# Patient Record
Sex: FEMALE | Race: WHITE | NOT HISPANIC OR LATINO | URBAN - METROPOLITAN AREA
[De-identification: names, ages, dates, MRNs, and addresses within clinical notes are randomized per-mention and may not be internally consistent; named-entity substitution may affect disease eponyms.]

---

## 2018-03-04 ENCOUNTER — EMERGENCY (EMERGENCY)
Facility: HOSPITAL | Age: 28
LOS: 0 days | Discharge: LEFT BEFORE DISCHARGE | End: 2018-03-05
Attending: EMERGENCY MEDICINE | Admitting: OBSTETRICS & GYNECOLOGY

## 2018-03-04 VITALS
OXYGEN SATURATION: 100 % | TEMPERATURE: 98 F | HEART RATE: 108 BPM | RESPIRATION RATE: 20 BRPM | DIASTOLIC BLOOD PRESSURE: 97 MMHG | SYSTOLIC BLOOD PRESSURE: 151 MMHG

## 2018-03-04 VITALS
HEART RATE: 84 BPM | SYSTOLIC BLOOD PRESSURE: 132 MMHG | OXYGEN SATURATION: 100 % | DIASTOLIC BLOOD PRESSURE: 81 MMHG | TEMPERATURE: 98 F | RESPIRATION RATE: 20 BRPM

## 2018-03-04 DIAGNOSIS — F41.9 ANXIETY DISORDER, UNSPECIFIED: ICD-10-CM

## 2018-03-04 DIAGNOSIS — R20.0 ANESTHESIA OF SKIN: ICD-10-CM

## 2018-03-04 DIAGNOSIS — R51 HEADACHE: ICD-10-CM

## 2018-03-04 LAB
ALBUMIN SERPL ELPH-MCNC: 4.4 G/DL — SIGNIFICANT CHANGE UP (ref 3–5.5)
ALP SERPL-CCNC: 43 U/L — SIGNIFICANT CHANGE UP (ref 30–115)
ALT FLD-CCNC: 18 U/L — SIGNIFICANT CHANGE UP (ref 0–41)
ANION GAP SERPL CALC-SCNC: 12 MMOL/L — SIGNIFICANT CHANGE UP (ref 7–14)
AST SERPL-CCNC: 63 U/L — HIGH (ref 0–41)
BASOPHILS # BLD AUTO: 0.04 K/UL — SIGNIFICANT CHANGE UP (ref 0–0.2)
BASOPHILS NFR BLD AUTO: 0.4 % — SIGNIFICANT CHANGE UP (ref 0–1)
BILIRUB SERPL-MCNC: 1.7 MG/DL — HIGH (ref 0.2–1.2)
BUN SERPL-MCNC: 7 MG/DL — LOW (ref 10–20)
CALCIUM SERPL-MCNC: 9.2 MG/DL — SIGNIFICANT CHANGE UP (ref 8.5–10.1)
CHLORIDE SERPL-SCNC: 104 MMOL/L — SIGNIFICANT CHANGE UP (ref 98–110)
CK MB BLD-MCNC: 1 % — SIGNIFICANT CHANGE UP (ref 0–4)
CK MB CFR SERPL CALC: 1.8 NG/ML — SIGNIFICANT CHANGE UP (ref 0.6–6.3)
CK SERPL-CCNC: 205 U/L — SIGNIFICANT CHANGE UP (ref 0–225)
CO2 SERPL-SCNC: 21 MMOL/L — SIGNIFICANT CHANGE UP (ref 17–32)
CREAT SERPL-MCNC: 0.7 MG/DL — SIGNIFICANT CHANGE UP (ref 0.7–1.5)
EOSINOPHIL # BLD AUTO: 0.33 K/UL — SIGNIFICANT CHANGE UP (ref 0–0.7)
EOSINOPHIL NFR BLD AUTO: 2.9 % — SIGNIFICANT CHANGE UP (ref 0–8)
GLUCOSE SERPL-MCNC: 95 MG/DL — SIGNIFICANT CHANGE UP (ref 70–110)
HCT VFR BLD CALC: 39.3 % — SIGNIFICANT CHANGE UP (ref 37–47)
HGB BLD-MCNC: 13.3 G/DL — SIGNIFICANT CHANGE UP (ref 12–16)
IMM GRANULOCYTES NFR BLD AUTO: 0.4 % — HIGH (ref 0.1–0.3)
INR BLD: 0.99 RATIO — SIGNIFICANT CHANGE UP (ref 0.65–1.3)
LYMPHOCYTES # BLD AUTO: 28.7 % — SIGNIFICANT CHANGE UP (ref 20.5–51.1)
LYMPHOCYTES # BLD AUTO: 3.24 K/UL — SIGNIFICANT CHANGE UP (ref 1.2–3.4)
MAGNESIUM SERPL-MCNC: 2.2 MG/DL — SIGNIFICANT CHANGE UP (ref 1.8–2.4)
MCHC RBC-ENTMCNC: 30.2 PG — SIGNIFICANT CHANGE UP (ref 27–31)
MCHC RBC-ENTMCNC: 33.8 G/DL — SIGNIFICANT CHANGE UP (ref 32–37)
MCV RBC AUTO: 89.3 FL — SIGNIFICANT CHANGE UP (ref 81–99)
MONOCYTES # BLD AUTO: 1.03 K/UL — HIGH (ref 0.1–0.6)
MONOCYTES NFR BLD AUTO: 9.1 % — SIGNIFICANT CHANGE UP (ref 1.7–9.3)
NEUTROPHILS # BLD AUTO: 6.61 K/UL — HIGH (ref 1.4–6.5)
NEUTROPHILS NFR BLD AUTO: 58.5 % — SIGNIFICANT CHANGE UP (ref 42.2–75.2)
NRBC # BLD: 0 /100 WBCS — SIGNIFICANT CHANGE UP (ref 0–0)
PLATELET # BLD AUTO: 341 K/UL — SIGNIFICANT CHANGE UP (ref 130–400)
POTASSIUM SERPL-MCNC: 6.1 MMOL/L — CRITICAL HIGH (ref 3.5–5)
POTASSIUM SERPL-SCNC: 6.1 MMOL/L — CRITICAL HIGH (ref 3.5–5)
PROT SERPL-MCNC: 6.8 G/DL — SIGNIFICANT CHANGE UP (ref 6–8)
PROTHROM AB SERPL-ACNC: 10.7 SEC — SIGNIFICANT CHANGE UP (ref 9.95–12.87)
RBC # BLD: 4.4 M/UL — SIGNIFICANT CHANGE UP (ref 4.2–5.4)
RBC # FLD: 12 % — SIGNIFICANT CHANGE UP (ref 11.5–14.5)
SODIUM SERPL-SCNC: 137 MMOL/L — SIGNIFICANT CHANGE UP (ref 135–146)
TROPONIN I SERPL-MCNC: <0.02 NG/ML — SIGNIFICANT CHANGE UP (ref 0–0.05)
WBC # BLD: 11.29 K/UL — HIGH (ref 4.8–10.8)
WBC # FLD AUTO: 11.29 K/UL — HIGH (ref 4.8–10.8)

## 2018-03-04 RX ORDER — ASPIRIN/CALCIUM CARB/MAGNESIUM 324 MG
325 TABLET ORAL ONCE
Qty: 0 | Refills: 0 | Status: COMPLETED | OUTPATIENT
Start: 2018-03-04 | End: 2018-03-04

## 2018-03-04 RX ORDER — METOCLOPRAMIDE HCL 10 MG
10 TABLET ORAL ONCE
Qty: 0 | Refills: 0 | Status: COMPLETED | OUTPATIENT
Start: 2018-03-04 | End: 2018-03-04

## 2018-03-04 RX ORDER — SODIUM CHLORIDE 9 MG/ML
1000 INJECTION INTRAMUSCULAR; INTRAVENOUS; SUBCUTANEOUS ONCE
Qty: 0 | Refills: 0 | Status: DISCONTINUED | OUTPATIENT
Start: 2018-03-04 | End: 2018-03-05

## 2018-03-04 RX ADMIN — Medication 104 MILLIGRAM(S): at 22:24

## 2018-03-04 RX ADMIN — Medication 325 MILLIGRAM(S): at 22:24

## 2018-03-04 NOTE — ED PROVIDER NOTE - OBJECTIVE STATEMENT
27y F wo sig PMH presents for sensation of LUE and LLE numbness that came on suddenly 30 minutes ago. Pt has had mild headache all day. No trauma. No f/c/n/v/d. No hx of similar symptoms.

## 2018-03-04 NOTE — ED PROVIDER NOTE - ATTENDING CONTRIBUTION TO CARE
27yr old female no sig pmhx here for eval of left facial, arm/leg numbness. pt reports that today had development of headache. headache was global not sudden onset. pt atributed it to drinking last night and thought it was just a "hangover". pt however approx 30 minutes prior to arrival developed feeling of numbness to left face, left upper and lower ext. sx's not associated with weakness, neck pain, vision changes. pt on exam no distress, awake alert, neck supple, perrl eomi, motor 5/5 X4, normal finger to nose, no dysmetria, subjective decrease in sensation to left face, arm and leg. no weakness to ext. after negative head ct. sx's resolved pt from [place] will ama and want to f/u with doctor closer to home. pt understood concern for stroke or tia. 27yr old female no sig pmhx here for eval of left facial, arm/leg numbness. pt reports that today had development of headache. headache was global not sudden onset. pt attributed it to drinking last night and thought it was just a "hangover". pt however approx 30 minutes prior to arrival developed feeling of numbness to left face, left upper and lower ext. sx's not associated with weakness, neck pain, vision changes. pt on exam no distress, awake alert, neck supple, perrl eomi, motor 5/5 X4, normal finger to nose, no dysmetria, subjective decrease in sensation to left face, arm and leg. no weakness to ext. after negative head ct. sx's resolved pt from [place] will ama and want to f/u with doctor closer to home. pt understood concern for stroke or tia.

## 2018-03-04 NOTE — ED PROVIDER NOTE - NS ED ROS FT
Constitutional: No fever or chills. Normal appetite. No unintended weight loss.   Eyes: No vision changes.  ENT: No hearing changes. No ear pain. No sore throat.  Neck: No neck pain or stiffness.  Cardiovascular: No chest pain, palpitations, or edema.  Pulmonary: No cough or SOB. No hemoptysis.  Abdominal: No abdominal pain, nausea, vomiting, or diarrhea.   : No dysuria or frequency. No hematuria.   Neuro: No syncope or dizziness. Subjective numbness of L extremities.   MS: No back pain. No calf pain/swelling.  Psych: No suicidal or homicidal ideations.

## 2018-03-04 NOTE — ED PROVIDER NOTE - PHYSICAL EXAMINATION
Constitutional: Well developed, well nourished. Very anxious. Walking around the room. Unwilling to stay in the stretcher.   Head: Atraumatic.  Eyes: PERRLA. EOMI without discomfort.   ENT: No nasal discharge. Mucous membranes moist.  Neck: Supple. Painless ROM.  Cardiovascular: Regular rhythm. Regular rate. Normal S1 and S2. No murmurs. 2+ pulses in all extremities.   Pulmonary: Normal respiratory rate and effort. Lungs clear to auscultation bilaterally. No wheezing, rales, or rhonchi. Bilateral, equal lung expansion.   Abdominal: Soft. Nondistended. Nontender. No rebound or guarding.   Extremities. Ambulatory. No lower extremity edema. Symmetric calves.  Skin: No rashes.   Neuro: AAOx3. L side subjective decreased sensation. Speech normal. CN 2-12 intact. No facial asymmetry.   Psych: Normal mood. Normal affect.

## 2018-03-04 NOTE — ED PROVIDER NOTE - PROGRESS NOTE DETAILS
Discussed with Dr. Caitlyn roberts. my exam only with subjective weakness to left arm and leg. if ct negative and pt still with sx's admit to stroke unit, MRI, MRA brain/neck, asa pt sx's have improved. now with no sensory deficit. pt advised to stay for MRI for possible stroke or TIA. I explained in detail concern with pt. will ama. The patient wishes to leave against medical advice.  I have discussed the risks, benefits and alternatives (including the possibility of worsening of disease, pain, permanent disability, and/or death) with the patient and his/her family (if available).  The patient voices understanding of these risks, benefits, and alternatives and still wishes to sign out against medical advice.  The patient is awake, alert, oriented  x 3 and has demonstrated capacity to refuse/direct care.  I have advised the patient that they can and should return immediately should they develop any worse/different/additional symptoms, or if they change their mind and want to continue their care.

## 2018-03-04 NOTE — ED ADULT NURSE REASSESSMENT NOTE - NS ED NURSE REASSESS COMMENT FT1
Pt presents with multiple complaints, stating that she began having a headache intermittently over several days. She states that she had alchoholic drinks yesterday and today began to feel intermittent numbness. At first the numbness was in bilateral arms than began to focus on the left side accompanied with a left sided headache. The pt also states that she feels anxious, and and as if she cannot sit down and that she needs to pace back and forth. There is no slurred speech noted, no facial droop noted, bilateral hand grasps are equal on both sides, gait is steady.

## 2018-03-04 NOTE — ED PROVIDER NOTE - MEDICAL DECISION MAKING DETAILS
numbness, now resolved wanted to place pt in edou under tia protocol. pt signed out ama. will f/u with neuro as outpt

## 2018-03-12 ENCOUNTER — EMERGENCY (EMERGENCY)
Facility: HOSPITAL | Age: 28
LOS: 0 days | Discharge: LEFT BEFORE DISCHARGE | End: 2018-03-12

## 2018-03-12 VITALS
DIASTOLIC BLOOD PRESSURE: 87 MMHG | OXYGEN SATURATION: 98 % | TEMPERATURE: 98 F | RESPIRATION RATE: 18 BRPM | HEART RATE: 90 BPM | SYSTOLIC BLOOD PRESSURE: 154 MMHG

## 2018-03-12 DIAGNOSIS — R51 HEADACHE: ICD-10-CM

## 2018-03-12 DIAGNOSIS — R42 DIZZINESS AND GIDDINESS: ICD-10-CM

## 2018-03-12 NOTE — ED ADULT TRIAGE NOTE - CHIEF COMPLAINT QUOTE
pt was seen x 1 week ago for stroke like symptoms, dx with stress/anxiety, scheduled to have MRI, c/o HA, dizziness and near syncope every day since episode.

## 2018-11-12 ENCOUNTER — OFFICE VISIT (OUTPATIENT)
Dept: INTERNAL MEDICINE CLINIC | Facility: CLINIC | Age: 28
End: 2018-11-12
Payer: COMMERCIAL

## 2018-11-12 VITALS
TEMPERATURE: 98.9 F | DIASTOLIC BLOOD PRESSURE: 82 MMHG | OXYGEN SATURATION: 97 % | HEART RATE: 72 BPM | WEIGHT: 168 LBS | BODY MASS INDEX: 27.99 KG/M2 | SYSTOLIC BLOOD PRESSURE: 108 MMHG | HEIGHT: 65 IN

## 2018-11-12 DIAGNOSIS — R51.9 CHRONIC NONINTRACTABLE HEADACHE, UNSPECIFIED HEADACHE TYPE: Primary | ICD-10-CM

## 2018-11-12 DIAGNOSIS — G89.29 CHRONIC NONINTRACTABLE HEADACHE, UNSPECIFIED HEADACHE TYPE: Primary | ICD-10-CM

## 2018-11-12 DIAGNOSIS — Z87.828 HISTORY OF TRAUMATIC HEAD INJURY: ICD-10-CM

## 2018-11-12 DIAGNOSIS — Z87.898 HISTORY OF DOMESTIC VIOLENCE: ICD-10-CM

## 2018-11-12 PROCEDURE — 99203 OFFICE O/P NEW LOW 30 MIN: CPT | Performed by: NURSE PRACTITIONER

## 2018-11-12 PROCEDURE — 3008F BODY MASS INDEX DOCD: CPT | Performed by: NURSE PRACTITIONER

## 2018-11-12 RX ORDER — LEVONORGESTREL/ETHINYL ESTRADIOL AND ETHINYL ESTRADIOL 100-20(84)
KIT ORAL
COMMUNITY
Start: 2018-08-28 | End: 2019-04-05 | Stop reason: ALTCHOICE
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
## 2018-11-12 NOTE — PATIENT INSTRUCTIONS
Will check MRI  Will get records of previous PCP in [place] - recent labs    Likely stress and anxiety are factoring in    May take tylenol or motrin for pain    Pt to follow-up in 1 month

## 2019-03-10 ENCOUNTER — EMERGENCY (EMERGENCY)
Facility: HOSPITAL | Age: 29
LOS: 0 days | Discharge: HOME | End: 2019-03-10
Admitting: PHYSICIAN ASSISTANT

## 2019-03-10 VITALS
DIASTOLIC BLOOD PRESSURE: 69 MMHG | TEMPERATURE: 99 F | RESPIRATION RATE: 18 BRPM | OXYGEN SATURATION: 100 % | SYSTOLIC BLOOD PRESSURE: 132 MMHG | HEART RATE: 82 BPM

## 2019-03-10 DIAGNOSIS — R51 HEADACHE: ICD-10-CM

## 2019-03-10 DIAGNOSIS — F17.200 NICOTINE DEPENDENCE, UNSPECIFIED, UNCOMPLICATED: ICD-10-CM

## 2019-03-10 NOTE — ED PROVIDER NOTE - PHYSICAL EXAMINATION
VITALS:  I have reviewed the initial vital signs.  GENERAL: Well-developed, well-nourished, in no acute distress.  HEENT: Sclera clear. EOMI, PERRLA. Mucous membranes moist, oropharynx nonerythematous without exudate. Tonsils 2+ bilaterally. Uvula midline and without edema. TM's clear b/l without bulging or erythema. Nasal turbinates clear and w/o discharge.  NECK: supple w FROM. No cervical adenopathy. No midline cervical spinous tenderness, step offs, or deformity.  CARDIO: RRR, nl S1 and S2. No murmurs, rubs, or gallops.  PULM: Normal effort. CTA b/l without wheezes, rales, or rhonchi.  MSK: Normal, steady gait.  SKIN: Warm, dry. No pallor or rashes. Capillary refill <2 seconds.  NEURO: A&Ox3. Speech clear. CN II-XII intact. 5/5 strength to upper and lower extremities b/l. Sensation intact and equal throughout. No pronator drift. Finger to nose intact. Normal heel to shin.

## 2019-03-10 NOTE — ED PROVIDER NOTE - NS ED ROS FT
CONSTITUTIONAL: (-) fevers, (-) chills, (-) fatigue, (-) unintentional weight loss  EYES: (-) vision changes, (-) blurry vision, (-) double vision, (-) eye pain  ENT: (-) ear pain, (-) ear drainage, (-) congestion, (-) rhinorrhea, (-) sore throat  NECK: (-) neck pain, (-) neck stiffness  CARDIO: (-) chest pain, (-) palpitations  PULM: (-) cough, (-) sputum, (-) shortness of breath  GI: (-) nausea, (-) vomiting, (-) diarrhea (-) abdominal pain  : (-) dysuria, (-) hematuria, (-) frequency  MSK: (-) back pain,  (-) myalgias, (-) gait difficulty  SKIN: (-) rashes, (-) wounds  NEURO: (+) headache, (-) head injury, (-) LOC, (-) dizziness, (-) lightheadedness, (-) syncope, (-) numbness, (-) weakness, (-) seizures, (-) paresthesias    *all other systems negative except as documented above and in the HPI*

## 2019-03-10 NOTE — ED PROVIDER NOTE - CLINICAL SUMMARY MEDICAL DECISION MAKING FREE TEXT BOX
28 year old female w no significant pmhx presents with 1 week of transient left sided stabbing headaches. No fevers, n/v, neurologic symptoms. Neurologically intact, no signs of head trauma. Patient denying pain medications in the ED, states her pain is too short lasting and not severe enough to warrant pain medication. States she is seen by PMD and neurology in [place], will f/u with them. patient understands return precautions.

## 2019-03-10 NOTE — ED ADULT NURSE NOTE - OBJECTIVE STATEMENT
pt complaining of intermittent headache stabbing pain to left side of head last approx 1 min. and goes away. no neurological symptoms associated, or pain management. pt denies visual changes n/v.

## 2019-03-10 NOTE — ED PROVIDER NOTE - OBJECTIVE STATEMENT
28 year old female w hx of anxiety presents to the ED with 1 week of left-sided, sharp headaches. Patient states her pain lasts 1-2 minutes when it occurs 28 year old female w hx of anxiety presents to the ED with 1 week of left-sided, sharp headaches. Patient states her pain lasts 1-2 minutes when it occurs. States she does not feel it is severe or frequent enough to take pain medications. Denies injury/trauma to her head. Denies associated fevers/chills, recent URI, neck pain, n/v, extremity numbness/weakness/paresthesias, speech changes, seizures, confusion. No anticoagulant use.

## 2019-04-05 ENCOUNTER — OFFICE VISIT (OUTPATIENT)
Dept: INTERNAL MEDICINE CLINIC | Facility: CLINIC | Age: 29
End: 2019-04-05
Payer: COMMERCIAL

## 2019-04-05 VITALS
SYSTOLIC BLOOD PRESSURE: 122 MMHG | TEMPERATURE: 97.9 F | WEIGHT: 170.2 LBS | OXYGEN SATURATION: 99 % | HEART RATE: 84 BPM | DIASTOLIC BLOOD PRESSURE: 80 MMHG | BODY MASS INDEX: 29.06 KG/M2 | HEIGHT: 64 IN

## 2019-04-05 DIAGNOSIS — R51.9 CHRONIC NONINTRACTABLE HEADACHE, UNSPECIFIED HEADACHE TYPE: Primary | ICD-10-CM

## 2019-04-05 DIAGNOSIS — Z87.828 HISTORY OF TRAUMATIC HEAD INJURY: ICD-10-CM

## 2019-04-05 DIAGNOSIS — Z13.220 SCREENING, LIPID: ICD-10-CM

## 2019-04-05 DIAGNOSIS — R30.0 DYSURIA: ICD-10-CM

## 2019-04-05 DIAGNOSIS — R42 DIZZINESS: ICD-10-CM

## 2019-04-05 DIAGNOSIS — H53.8 BLURRED VISION, BILATERAL: ICD-10-CM

## 2019-04-05 DIAGNOSIS — G89.29 CHRONIC NONINTRACTABLE HEADACHE, UNSPECIFIED HEADACHE TYPE: Primary | ICD-10-CM

## 2019-04-05 LAB
SL AMB  POCT GLUCOSE, UA: NEGATIVE
SL AMB LEUKOCYTE ESTERASE,UA: NEGATIVE
SL AMB POCT BILIRUBIN,UA: NEGATIVE
SL AMB POCT BLOOD,UA: NORMAL
SL AMB POCT CLARITY,UA: CLEAR
SL AMB POCT COLOR,UA: YELLOW
SL AMB POCT KETONES,UA: NEGATIVE
SL AMB POCT NITRITE,UA: NEGATIVE
SL AMB POCT PH,UA: 7
SL AMB POCT SPECIFIC GRAVITY,UA: 1.02
SL AMB POCT URINE PROTEIN: NEGATIVE
SL AMB POCT UROBILINOGEN: 0.2

## 2019-04-05 PROCEDURE — 99215 OFFICE O/P EST HI 40 MIN: CPT | Performed by: NURSE PRACTITIONER

## 2019-04-05 PROCEDURE — 81003 URINALYSIS AUTO W/O SCOPE: CPT | Performed by: NURSE PRACTITIONER

## 2019-04-05 RX ORDER — AMOXICILLIN 500 MG/1
500 TABLET, FILM COATED ORAL 4 TIMES DAILY
COMMUNITY
End: 2019-04-07

## 2019-04-05 RX ORDER — ETONOGESTREL/ETHINYL ESTRADIOL .12-.015MG
1 RING, VAGINAL VAGINAL
COMMUNITY
Start: 2019-03-05 | End: 2019-08-07

## 2019-04-07 ENCOUNTER — APPOINTMENT (EMERGENCY)
Dept: RADIOLOGY | Facility: HOSPITAL | Age: 29
End: 2019-04-07
Payer: COMMERCIAL

## 2019-04-07 ENCOUNTER — HOSPITAL ENCOUNTER (EMERGENCY)
Facility: HOSPITAL | Age: 29
Discharge: HOME | End: 2019-04-07
Attending: EMERGENCY MEDICINE | Admitting: EMERGENCY MEDICINE
Payer: COMMERCIAL

## 2019-04-07 VITALS
SYSTOLIC BLOOD PRESSURE: 129 MMHG | DIASTOLIC BLOOD PRESSURE: 71 MMHG | OXYGEN SATURATION: 99 % | TEMPERATURE: 97 F | RESPIRATION RATE: 18 BRPM | HEART RATE: 89 BPM

## 2019-04-07 DIAGNOSIS — S62.609A FINGER FRACTURE: Primary | ICD-10-CM

## 2019-04-07 PROCEDURE — 99283 EMERGENCY DEPT VISIT LOW MDM: CPT

## 2019-04-07 PROCEDURE — 99283 EMERGENCY DEPT VISIT LOW MDM: CPT | Performed by: PHYSICIAN ASSISTANT

## 2019-04-07 PROCEDURE — 73140 X-RAY EXAM OF FINGER(S): CPT

## 2019-04-07 RX ORDER — IBUPROFEN 600 MG/1
600 TABLET ORAL EVERY 6 HOURS PRN
Qty: 30 TABLET | Refills: 0 | Status: SHIPPED | OUTPATIENT
Start: 2019-04-07 | End: 2019-09-19 | Stop reason: ALTCHOICE

## 2019-04-07 RX ORDER — IBUPROFEN 600 MG/1
600 TABLET ORAL ONCE
Status: COMPLETED | OUTPATIENT
Start: 2019-04-07 | End: 2019-04-07

## 2019-04-07 RX ADMIN — IBUPROFEN 600 MG: 600 TABLET ORAL at 18:17

## 2019-04-08 ENCOUNTER — APPOINTMENT (OUTPATIENT)
Dept: URGENT CARE | Facility: MEDICAL CENTER | Age: 29
End: 2019-04-08
Payer: COMMERCIAL

## 2019-04-08 ENCOUNTER — HOSPITAL ENCOUNTER (EMERGENCY)
Facility: HOSPITAL | Age: 29
Discharge: HOME | End: 2019-04-08
Attending: EMERGENCY MEDICINE
Payer: COMMERCIAL

## 2019-04-08 ENCOUNTER — OFFICE VISIT (OUTPATIENT)
Dept: URGENT CARE | Facility: MEDICAL CENTER | Age: 29
End: 2019-04-08
Payer: COMMERCIAL

## 2019-04-08 VITALS
TEMPERATURE: 97 F | DIASTOLIC BLOOD PRESSURE: 88 MMHG | BODY MASS INDEX: 29.02 KG/M2 | WEIGHT: 170 LBS | SYSTOLIC BLOOD PRESSURE: 155 MMHG | HEART RATE: 88 BPM | OXYGEN SATURATION: 99 % | HEIGHT: 64 IN | RESPIRATION RATE: 18 BRPM

## 2019-04-08 VITALS
TEMPERATURE: 98 F | HEIGHT: 64 IN | HEART RATE: 88 BPM | RESPIRATION RATE: 16 BRPM | BODY MASS INDEX: 29.21 KG/M2 | OXYGEN SATURATION: 99 % | DIASTOLIC BLOOD PRESSURE: 76 MMHG | WEIGHT: 171.08 LBS | SYSTOLIC BLOOD PRESSURE: 134 MMHG

## 2019-04-08 DIAGNOSIS — R42 DIZZINESS: Primary | ICD-10-CM

## 2019-04-08 LAB
ALBUMIN SERPL BCP-MCNC: 3.9 G/DL (ref 3.5–5)
ALP SERPL-CCNC: 61 U/L (ref 46–116)
ALT SERPL W P-5'-P-CCNC: 13 U/L (ref 12–78)
ANION GAP SERPL CALCULATED.3IONS-SCNC: 10 MMOL/L (ref 4–13)
AST SERPL W P-5'-P-CCNC: 16 U/L (ref 5–45)
ATRIAL RATE: 77 BPM
BASOPHILS # BLD AUTO: 0.03 THOUSANDS/ΜL (ref 0–0.1)
BASOPHILS NFR BLD AUTO: 0 % (ref 0–1)
BILIRUB DIRECT SERPL-MCNC: 0.06 MG/DL (ref 0–0.2)
BILIRUB SERPL-MCNC: 0.35 MG/DL (ref 0.2–1)
BILIRUB UR QL STRIP: NEGATIVE
BUN SERPL-MCNC: 9 MG/DL (ref 5–25)
CALCIUM SERPL-MCNC: 9 MG/DL (ref 8.3–10.1)
CHLORIDE SERPL-SCNC: 104 MMOL/L (ref 100–108)
CLARITY UR: CLEAR
CO2 SERPL-SCNC: 28 MMOL/L (ref 21–32)
COLOR UR: YELLOW
COLOR, POC: YELLOW
CREAT SERPL-MCNC: 0.69 MG/DL (ref 0.6–1.3)
EOSINOPHIL # BLD AUTO: 0.16 THOUSAND/ΜL (ref 0–0.61)
EOSINOPHIL NFR BLD AUTO: 2 % (ref 0–6)
ERYTHROCYTE [DISTWIDTH] IN BLOOD BY AUTOMATED COUNT: 11.8 % (ref 11.6–15.1)
EXT PREG TEST URINE: NEGATIVE
GFR SERPL CREATININE-BSD FRML MDRD: 117 ML/MIN/1.73SQ M
GLUCOSE SERPL-MCNC: 93 MG/DL (ref 65–140)
GLUCOSE UR STRIP-MCNC: NEGATIVE MG/DL
HCT VFR BLD AUTO: 41.3 % (ref 34.8–46.1)
HGB BLD-MCNC: 14 G/DL (ref 11.5–15.4)
HGB UR QL STRIP.AUTO: NEGATIVE
IMM GRANULOCYTES # BLD AUTO: 0.02 THOUSAND/UL (ref 0–0.2)
IMM GRANULOCYTES NFR BLD AUTO: 0 % (ref 0–2)
KETONES UR STRIP-MCNC: NEGATIVE MG/DL
LEUKOCYTE ESTERASE UR QL STRIP: NEGATIVE
LYMPHOCYTES # BLD AUTO: 1.66 THOUSANDS/ΜL (ref 0.6–4.47)
LYMPHOCYTES NFR BLD AUTO: 22 % (ref 14–44)
MAGNESIUM SERPL-MCNC: 2.1 MG/DL (ref 1.6–2.6)
MCH RBC QN AUTO: 31.5 PG (ref 26.8–34.3)
MCHC RBC AUTO-ENTMCNC: 33.9 G/DL (ref 31.4–37.4)
MCV RBC AUTO: 93 FL (ref 82–98)
MONOCYTES # BLD AUTO: 0.57 THOUSAND/ΜL (ref 0.17–1.22)
MONOCYTES NFR BLD AUTO: 8 % (ref 4–12)
NEUTROPHILS # BLD AUTO: 4.96 THOUSANDS/ΜL (ref 1.85–7.62)
NEUTS SEG NFR BLD AUTO: 68 % (ref 43–75)
NITRITE UR QL STRIP: NEGATIVE
NRBC BLD AUTO-RTO: 0 /100 WBCS
P AXIS: 15 DEGREES
PH UR STRIP.AUTO: 7.5 [PH] (ref 4.5–8)
PLATELET # BLD AUTO: 275 THOUSANDS/UL (ref 149–390)
PMV BLD AUTO: 10.1 FL (ref 8.9–12.7)
POTASSIUM SERPL-SCNC: 4.1 MMOL/L (ref 3.5–5.3)
PR INTERVAL: 126 MS
PROT SERPL-MCNC: 8.1 G/DL (ref 6.4–8.2)
PROT UR STRIP-MCNC: NEGATIVE MG/DL
QRS AXIS: 60 DEGREES
QRSD INTERVAL: 98 MS
QT INTERVAL: 394 MS
QTC INTERVAL: 445 MS
RBC # BLD AUTO: 4.45 MILLION/UL (ref 3.81–5.12)
SODIUM SERPL-SCNC: 142 MMOL/L (ref 136–145)
SP GR UR STRIP.AUTO: 1.01 (ref 1–1.03)
T WAVE AXIS: 26 DEGREES
TSH SERPL DL<=0.05 MIU/L-ACNC: 1.01 UIU/ML (ref 0.36–3.74)
UROBILINOGEN UR QL STRIP.AUTO: 0.2 E.U./DL
VENTRICULAR RATE: 77 BPM
WBC # BLD AUTO: 7.4 THOUSAND/UL (ref 4.31–10.16)

## 2019-04-08 PROCEDURE — 80048 BASIC METABOLIC PNL TOTAL CA: CPT | Performed by: EMERGENCY MEDICINE

## 2019-04-08 PROCEDURE — 99284 EMERGENCY DEPT VISIT MOD MDM: CPT

## 2019-04-08 PROCEDURE — 81025 URINE PREGNANCY TEST: CPT | Performed by: EMERGENCY MEDICINE

## 2019-04-08 PROCEDURE — 99213 OFFICE O/P EST LOW 20 MIN: CPT | Performed by: PHYSICIAN ASSISTANT

## 2019-04-08 PROCEDURE — 93005 ELECTROCARDIOGRAM TRACING: CPT

## 2019-04-08 PROCEDURE — 96360 HYDRATION IV INFUSION INIT: CPT

## 2019-04-08 PROCEDURE — 81003 URINALYSIS AUTO W/O SCOPE: CPT

## 2019-04-08 PROCEDURE — 99283 EMERGENCY DEPT VISIT LOW MDM: CPT | Performed by: EMERGENCY MEDICINE

## 2019-04-08 PROCEDURE — 83735 ASSAY OF MAGNESIUM: CPT | Performed by: EMERGENCY MEDICINE

## 2019-04-08 PROCEDURE — 93010 ELECTROCARDIOGRAM REPORT: CPT | Performed by: INTERNAL MEDICINE

## 2019-04-08 PROCEDURE — 36415 COLL VENOUS BLD VENIPUNCTURE: CPT | Performed by: EMERGENCY MEDICINE

## 2019-04-08 PROCEDURE — 96361 HYDRATE IV INFUSION ADD-ON: CPT

## 2019-04-08 PROCEDURE — 85025 COMPLETE CBC W/AUTO DIFF WBC: CPT | Performed by: EMERGENCY MEDICINE

## 2019-04-08 PROCEDURE — 84443 ASSAY THYROID STIM HORMONE: CPT | Performed by: EMERGENCY MEDICINE

## 2019-04-08 PROCEDURE — 80076 HEPATIC FUNCTION PANEL: CPT | Performed by: EMERGENCY MEDICINE

## 2019-04-08 RX ORDER — MECLIZINE HYDROCHLORIDE 25 MG/1
25 TABLET ORAL ONCE
Status: COMPLETED | OUTPATIENT
Start: 2019-04-08 | End: 2019-04-08

## 2019-04-08 RX ORDER — MECLIZINE HCL 12.5 MG/1
25 TABLET ORAL ONCE
Status: COMPLETED | OUTPATIENT
Start: 2019-04-08 | End: 2019-04-08

## 2019-04-08 RX ORDER — MECLIZINE HYDROCHLORIDE 25 MG/1
25 TABLET ORAL 3 TIMES DAILY PRN
Qty: 30 TABLET | Refills: 0 | Status: SHIPPED | OUTPATIENT
Start: 2019-04-08 | End: 2019-04-08 | Stop reason: SDUPTHER

## 2019-04-08 RX ORDER — MECLIZINE HYDROCHLORIDE 25 MG/1
25 TABLET ORAL 3 TIMES DAILY PRN
Qty: 30 TABLET | Refills: 0 | Status: SHIPPED | OUTPATIENT
Start: 2019-04-08 | End: 2019-04-17

## 2019-04-08 RX ADMIN — MECLIZINE 25 MG: 12.5 TABLET ORAL at 14:04

## 2019-04-08 RX ADMIN — MECLIZINE HYDROCHLORIDE 25 MG: 25 TABLET ORAL at 11:32

## 2019-04-08 RX ADMIN — SODIUM CHLORIDE 1000 ML: 0.9 INJECTION, SOLUTION INTRAVENOUS at 14:01

## 2019-04-09 ENCOUNTER — TELEPHONE (OUTPATIENT)
Dept: OBGYN CLINIC | Facility: MEDICAL CENTER | Age: 29
End: 2019-04-09

## 2019-04-09 ENCOUNTER — TELEPHONE (OUTPATIENT)
Dept: INTERNAL MEDICINE CLINIC | Facility: CLINIC | Age: 29
End: 2019-04-09

## 2019-04-09 ENCOUNTER — TELEPHONE (OUTPATIENT)
Dept: INTERNAL MEDICINE CLINIC | Age: 29
End: 2019-04-09

## 2019-04-10 ENCOUNTER — OFFICE VISIT (OUTPATIENT)
Dept: INTERNAL MEDICINE CLINIC | Facility: CLINIC | Age: 29
End: 2019-04-10
Payer: COMMERCIAL

## 2019-04-10 VITALS
WEIGHT: 172.4 LBS | BODY MASS INDEX: 29.43 KG/M2 | HEIGHT: 64 IN | OXYGEN SATURATION: 99 % | TEMPERATURE: 98.5 F | DIASTOLIC BLOOD PRESSURE: 84 MMHG | HEART RATE: 77 BPM | SYSTOLIC BLOOD PRESSURE: 124 MMHG

## 2019-04-10 DIAGNOSIS — R35.0 URINE FREQUENCY: ICD-10-CM

## 2019-04-10 DIAGNOSIS — N92.0 MENORRHAGIA WITH REGULAR CYCLE: ICD-10-CM

## 2019-04-10 DIAGNOSIS — R42 DIZZINESS: Primary | ICD-10-CM

## 2019-04-10 DIAGNOSIS — R51.9 CHRONIC NONINTRACTABLE HEADACHE, UNSPECIFIED HEADACHE TYPE: ICD-10-CM

## 2019-04-10 DIAGNOSIS — H53.8 BLURRED VISION, BILATERAL: ICD-10-CM

## 2019-04-10 DIAGNOSIS — G89.29 CHRONIC NONINTRACTABLE HEADACHE, UNSPECIFIED HEADACHE TYPE: ICD-10-CM

## 2019-04-10 DIAGNOSIS — R42 VERTIGO: ICD-10-CM

## 2019-04-10 LAB
PROLACTIN SERPL-MCNC: 18.1 NG/ML
SL AMB  POCT GLUCOSE, UA: NORMAL
SL AMB LEUKOCYTE ESTERASE,UA: NORMAL
SL AMB POCT BILIRUBIN,UA: NORMAL
SL AMB POCT BLOOD,UA: NORMAL
SL AMB POCT CLARITY,UA: CLEAR
SL AMB POCT COLOR,UA: YELLOW
SL AMB POCT KETONES,UA: NORMAL
SL AMB POCT NITRITE,UA: NORMAL
SL AMB POCT PH,UA: 7
SL AMB POCT SPECIFIC GRAVITY,UA: 1.01
SL AMB POCT URINE PROTEIN: NORMAL
SL AMB POCT UROBILINOGEN: 0.2

## 2019-04-10 PROCEDURE — 1036F TOBACCO NON-USER: CPT | Performed by: INTERNAL MEDICINE

## 2019-04-10 PROCEDURE — 84146 ASSAY OF PROLACTIN: CPT | Performed by: INTERNAL MEDICINE

## 2019-04-10 PROCEDURE — 36415 COLL VENOUS BLD VENIPUNCTURE: CPT | Performed by: INTERNAL MEDICINE

## 2019-04-10 PROCEDURE — 99215 OFFICE O/P EST HI 40 MIN: CPT | Performed by: INTERNAL MEDICINE

## 2019-04-10 PROCEDURE — 3008F BODY MASS INDEX DOCD: CPT | Performed by: INTERNAL MEDICINE

## 2019-04-10 PROCEDURE — 81002 URINALYSIS NONAUTO W/O SCOPE: CPT | Performed by: INTERNAL MEDICINE

## 2019-04-11 ENCOUNTER — HOSPITAL ENCOUNTER (OUTPATIENT)
Dept: MRI IMAGING | Facility: HOSPITAL | Age: 29
Discharge: HOME | End: 2019-04-11
Attending: INTERNAL MEDICINE
Payer: COMMERCIAL

## 2019-04-11 DIAGNOSIS — H53.8 BLURRED VISION, BILATERAL: ICD-10-CM

## 2019-04-11 DIAGNOSIS — R42 DIZZINESS: ICD-10-CM

## 2019-04-11 DIAGNOSIS — R42 VERTIGO: ICD-10-CM

## 2019-04-11 DIAGNOSIS — R51.9 CHRONIC NONINTRACTABLE HEADACHE, UNSPECIFIED HEADACHE TYPE: ICD-10-CM

## 2019-04-11 DIAGNOSIS — G89.29 CHRONIC NONINTRACTABLE HEADACHE, UNSPECIFIED HEADACHE TYPE: ICD-10-CM

## 2019-04-11 PROCEDURE — 70551 MRI BRAIN STEM W/O DYE: CPT

## 2019-04-16 ENCOUNTER — TELEPHONE (OUTPATIENT)
Dept: INTERNAL MEDICINE CLINIC | Age: 29
End: 2019-04-16

## 2019-04-17 ENCOUNTER — OFFICE VISIT (OUTPATIENT)
Dept: INTERNAL MEDICINE CLINIC | Facility: CLINIC | Age: 29
End: 2019-04-17
Payer: COMMERCIAL

## 2019-04-17 VITALS
WEIGHT: 175.2 LBS | OXYGEN SATURATION: 98 % | HEIGHT: 64 IN | DIASTOLIC BLOOD PRESSURE: 82 MMHG | HEART RATE: 88 BPM | SYSTOLIC BLOOD PRESSURE: 112 MMHG | BODY MASS INDEX: 29.91 KG/M2 | TEMPERATURE: 98.5 F

## 2019-04-17 DIAGNOSIS — G93.2 PSEUDOTUMOR CEREBRI SYNDROME: Primary | ICD-10-CM

## 2019-04-17 PROCEDURE — 3008F BODY MASS INDEX DOCD: CPT | Performed by: INTERNAL MEDICINE

## 2019-04-17 PROCEDURE — 99213 OFFICE O/P EST LOW 20 MIN: CPT | Performed by: INTERNAL MEDICINE

## 2019-04-17 RX ORDER — HYDROCHLOROTHIAZIDE 25 MG/1
12.5 TABLET ORAL DAILY
Qty: 15 TABLET | Refills: 0 | Status: SHIPPED | OUTPATIENT
Start: 2019-04-17 | End: 2019-09-19 | Stop reason: ALTCHOICE

## 2019-04-18 ENCOUNTER — TELEPHONE (OUTPATIENT)
Dept: INTERNAL MEDICINE CLINIC | Facility: CLINIC | Age: 29
End: 2019-04-18

## 2019-05-14 DIAGNOSIS — G93.2 PSEUDOTUMOR CEREBRI SYNDROME: ICD-10-CM

## 2019-05-14 RX ORDER — HYDROCHLOROTHIAZIDE 25 MG/1
12.5 TABLET ORAL DAILY
Qty: 15 TABLET | Refills: 0 | OUTPATIENT
Start: 2019-05-14

## 2019-06-11 ENCOUNTER — APPOINTMENT (EMERGENCY)
Dept: ULTRASOUND IMAGING | Facility: HOSPITAL | Age: 29
End: 2019-06-11
Payer: COMMERCIAL

## 2019-06-11 ENCOUNTER — HOSPITAL ENCOUNTER (EMERGENCY)
Facility: HOSPITAL | Age: 29
Discharge: HOME | End: 2019-06-11
Attending: EMERGENCY MEDICINE
Payer: COMMERCIAL

## 2019-06-11 VITALS
HEART RATE: 92 BPM | SYSTOLIC BLOOD PRESSURE: 128 MMHG | TEMPERATURE: 98.2 F | WEIGHT: 178.57 LBS | BODY MASS INDEX: 30.65 KG/M2 | RESPIRATION RATE: 16 BRPM | DIASTOLIC BLOOD PRESSURE: 73 MMHG | OXYGEN SATURATION: 99 %

## 2019-06-11 DIAGNOSIS — O20.0 THREATENED MISCARRIAGE: Primary | ICD-10-CM

## 2019-06-11 LAB
ABO GROUP BLD: NORMAL
B-HCG SERPL-ACNC: ABNORMAL MIU/ML
BASOPHILS # BLD AUTO: 0.02 THOUSANDS/ΜL (ref 0–0.1)
BASOPHILS NFR BLD AUTO: 0 % (ref 0–1)
BILIRUB UR QL STRIP: NEGATIVE
BLD GP AB SCN SERPL QL: NEGATIVE
CLARITY UR: CLEAR
CLARITY, POC: CLEAR
COLOR UR: YELLOW
COLOR, POC: YELLOW
EOSINOPHIL # BLD AUTO: 0.21 THOUSAND/ΜL (ref 0–0.61)
EOSINOPHIL NFR BLD AUTO: 2 % (ref 0–6)
ERYTHROCYTE [DISTWIDTH] IN BLOOD BY AUTOMATED COUNT: 12 % (ref 11.6–15.1)
EXT PREG TEST URINE: ABNORMAL
GLUCOSE UR STRIP-MCNC: NEGATIVE MG/DL
HCT VFR BLD AUTO: 37.9 % (ref 34.8–46.1)
HGB BLD-MCNC: 12.6 G/DL (ref 11.5–15.4)
HGB UR QL STRIP.AUTO: NEGATIVE
IMM GRANULOCYTES # BLD AUTO: 0.06 THOUSAND/UL (ref 0–0.2)
IMM GRANULOCYTES NFR BLD AUTO: 1 % (ref 0–2)
KETONES UR STRIP-MCNC: NEGATIVE MG/DL
LEUKOCYTE ESTERASE UR QL STRIP: NEGATIVE
LYMPHOCYTES # BLD AUTO: 1.74 THOUSANDS/ΜL (ref 0.6–4.47)
LYMPHOCYTES NFR BLD AUTO: 17 % (ref 14–44)
MCH RBC QN AUTO: 31.2 PG (ref 26.8–34.3)
MCHC RBC AUTO-ENTMCNC: 33.2 G/DL (ref 31.4–37.4)
MCV RBC AUTO: 94 FL (ref 82–98)
MONOCYTES # BLD AUTO: 0.79 THOUSAND/ΜL (ref 0.17–1.22)
MONOCYTES NFR BLD AUTO: 8 % (ref 4–12)
NEUTROPHILS # BLD AUTO: 7.58 THOUSANDS/ΜL (ref 1.85–7.62)
NEUTS SEG NFR BLD AUTO: 72 % (ref 43–75)
NITRITE UR QL STRIP: NEGATIVE
NRBC BLD AUTO-RTO: 0 /100 WBCS
PH UR STRIP.AUTO: 6 [PH] (ref 4.5–8)
PLATELET # BLD AUTO: 289 THOUSANDS/UL (ref 149–390)
PMV BLD AUTO: 10.1 FL (ref 8.9–12.7)
PROT UR STRIP-MCNC: NEGATIVE MG/DL
RBC # BLD AUTO: 4.04 MILLION/UL (ref 3.81–5.12)
RH BLD: POSITIVE
SP GR UR STRIP.AUTO: 1.02 (ref 1–1.03)
SPECIMEN EXPIRATION DATE: NORMAL
UROBILINOGEN UR QL STRIP.AUTO: 0.2 E.U./DL
WBC # BLD AUTO: 10.4 THOUSAND/UL (ref 4.31–10.16)

## 2019-06-11 PROCEDURE — 87086 URINE CULTURE/COLONY COUNT: CPT

## 2019-06-11 PROCEDURE — 85025 COMPLETE CBC W/AUTO DIFF WBC: CPT | Performed by: EMERGENCY MEDICINE

## 2019-06-11 PROCEDURE — 81003 URINALYSIS AUTO W/O SCOPE: CPT

## 2019-06-11 PROCEDURE — 84702 CHORIONIC GONADOTROPIN TEST: CPT | Performed by: EMERGENCY MEDICINE

## 2019-06-11 PROCEDURE — 99283 EMERGENCY DEPT VISIT LOW MDM: CPT | Performed by: EMERGENCY MEDICINE

## 2019-06-11 PROCEDURE — 76801 OB US < 14 WKS SINGLE FETUS: CPT

## 2019-06-11 PROCEDURE — 86900 BLOOD TYPING SEROLOGIC ABO: CPT | Performed by: EMERGENCY MEDICINE

## 2019-06-11 PROCEDURE — 36415 COLL VENOUS BLD VENIPUNCTURE: CPT | Performed by: EMERGENCY MEDICINE

## 2019-06-11 PROCEDURE — 99284 EMERGENCY DEPT VISIT MOD MDM: CPT

## 2019-06-11 PROCEDURE — 81025 URINE PREGNANCY TEST: CPT | Performed by: EMERGENCY MEDICINE

## 2019-06-11 PROCEDURE — 86901 BLOOD TYPING SEROLOGIC RH(D): CPT | Performed by: EMERGENCY MEDICINE

## 2019-06-11 PROCEDURE — 86850 RBC ANTIBODY SCREEN: CPT | Performed by: EMERGENCY MEDICINE

## 2019-06-12 LAB — BACTERIA UR CULT: NORMAL

## 2019-06-25 ENCOUNTER — HOSPITAL ENCOUNTER (EMERGENCY)
Facility: HOSPITAL | Age: 29
Discharge: HOME | End: 2019-06-25
Attending: EMERGENCY MEDICINE | Admitting: EMERGENCY MEDICINE
Payer: COMMERCIAL

## 2019-06-25 VITALS
WEIGHT: 176.37 LBS | RESPIRATION RATE: 18 BRPM | SYSTOLIC BLOOD PRESSURE: 128 MMHG | OXYGEN SATURATION: 98 % | TEMPERATURE: 98.8 F | HEART RATE: 85 BPM | DIASTOLIC BLOOD PRESSURE: 74 MMHG | BODY MASS INDEX: 30.27 KG/M2

## 2019-06-25 DIAGNOSIS — R10.30 LOWER ABDOMINAL PAIN: ICD-10-CM

## 2019-06-25 DIAGNOSIS — O20.9 VAGINAL BLEEDING IN PREGNANCY, FIRST TRIMESTER: Primary | ICD-10-CM

## 2019-06-25 LAB
ABO GROUP BLD: NORMAL
ALBUMIN SERPL BCP-MCNC: 3.5 G/DL (ref 3.5–5)
ALP SERPL-CCNC: 57 U/L (ref 46–116)
ALT SERPL W P-5'-P-CCNC: 15 U/L (ref 12–78)
ANION GAP SERPL CALCULATED.3IONS-SCNC: 8 MMOL/L (ref 4–13)
AST SERPL W P-5'-P-CCNC: 32 U/L (ref 5–45)
B-HCG SERPL-ACNC: ABNORMAL MIU/ML
BASOPHILS # BLD AUTO: 0.02 THOUSANDS/ΜL (ref 0–0.1)
BASOPHILS NFR BLD AUTO: 0 % (ref 0–1)
BILIRUB SERPL-MCNC: 0.58 MG/DL (ref 0.2–1)
BLD GP AB SCN SERPL QL: NEGATIVE
BUN SERPL-MCNC: 9 MG/DL (ref 5–25)
CALCIUM SERPL-MCNC: 9.2 MG/DL (ref 8.3–10.1)
CHLORIDE SERPL-SCNC: 102 MMOL/L (ref 100–108)
CO2 SERPL-SCNC: 26 MMOL/L (ref 21–32)
CREAT SERPL-MCNC: 0.49 MG/DL (ref 0.6–1.3)
EOSINOPHIL # BLD AUTO: 0.12 THOUSAND/ΜL (ref 0–0.61)
EOSINOPHIL NFR BLD AUTO: 1 % (ref 0–6)
ERYTHROCYTE [DISTWIDTH] IN BLOOD BY AUTOMATED COUNT: 11.9 % (ref 11.6–15.1)
GFR SERPL CREATININE-BSD FRML MDRD: 130 ML/MIN/1.73SQ M
GLUCOSE SERPL-MCNC: 100 MG/DL (ref 65–140)
HCT VFR BLD AUTO: 37 % (ref 34.8–46.1)
HGB BLD-MCNC: 12.5 G/DL (ref 11.5–15.4)
IMM GRANULOCYTES # BLD AUTO: 0.06 THOUSAND/UL (ref 0–0.2)
IMM GRANULOCYTES NFR BLD AUTO: 1 % (ref 0–2)
LYMPHOCYTES # BLD AUTO: 1.38 THOUSANDS/ΜL (ref 0.6–4.47)
LYMPHOCYTES NFR BLD AUTO: 13 % (ref 14–44)
MCH RBC QN AUTO: 31.6 PG (ref 26.8–34.3)
MCHC RBC AUTO-ENTMCNC: 33.8 G/DL (ref 31.4–37.4)
MCV RBC AUTO: 93 FL (ref 82–98)
MONOCYTES # BLD AUTO: 0.91 THOUSAND/ΜL (ref 0.17–1.22)
MONOCYTES NFR BLD AUTO: 8 % (ref 4–12)
NEUTROPHILS # BLD AUTO: 8.44 THOUSANDS/ΜL (ref 1.85–7.62)
NEUTS SEG NFR BLD AUTO: 77 % (ref 43–75)
NRBC BLD AUTO-RTO: 0 /100 WBCS
PLATELET # BLD AUTO: 257 THOUSANDS/UL (ref 149–390)
PMV BLD AUTO: 10.4 FL (ref 8.9–12.7)
POTASSIUM SERPL-SCNC: 4.6 MMOL/L (ref 3.5–5.3)
PROT SERPL-MCNC: 7.6 G/DL (ref 6.4–8.2)
RBC # BLD AUTO: 3.96 MILLION/UL (ref 3.81–5.12)
RH BLD: POSITIVE
SODIUM SERPL-SCNC: 136 MMOL/L (ref 136–145)
SPECIMEN EXPIRATION DATE: NORMAL
WBC # BLD AUTO: 10.93 THOUSAND/UL (ref 4.31–10.16)

## 2019-06-25 PROCEDURE — 80053 COMPREHEN METABOLIC PANEL: CPT | Performed by: PHYSICIAN ASSISTANT

## 2019-06-25 PROCEDURE — 86850 RBC ANTIBODY SCREEN: CPT | Performed by: EMERGENCY MEDICINE

## 2019-06-25 PROCEDURE — 84702 CHORIONIC GONADOTROPIN TEST: CPT | Performed by: PHYSICIAN ASSISTANT

## 2019-06-25 PROCEDURE — 85025 COMPLETE CBC W/AUTO DIFF WBC: CPT | Performed by: PHYSICIAN ASSISTANT

## 2019-06-25 PROCEDURE — 86900 BLOOD TYPING SEROLOGIC ABO: CPT | Performed by: EMERGENCY MEDICINE

## 2019-06-25 PROCEDURE — 86901 BLOOD TYPING SEROLOGIC RH(D): CPT | Performed by: EMERGENCY MEDICINE

## 2019-06-25 PROCEDURE — 36415 COLL VENOUS BLD VENIPUNCTURE: CPT | Performed by: EMERGENCY MEDICINE

## 2019-06-25 PROCEDURE — 99284 EMERGENCY DEPT VISIT MOD MDM: CPT | Performed by: PHYSICIAN ASSISTANT

## 2019-06-25 PROCEDURE — 99284 EMERGENCY DEPT VISIT MOD MDM: CPT

## 2019-06-25 NOTE — ED PROVIDER NOTES
History  Chief Complaint   Patient presents with    Vaginal Bleeding     Pt is 8 weeks pregnant  Had vaginal bleeding last week  Was seen by OB  Pain and bleeding started yesterday and was told to come to ED if pain happened again  Blood is bright red and had a few small clots this morning  Patient is a 29 y/o  currently at 8 weeks gestation who presents with abdominal pain and vaginal bleeding for 2 days  Patient states she noted bright red blood and sharp, stabbing lower abdominal pain last night  She states the pain has mostly subsided, but she was concerned because she noted small dark red clotted blood this morning after her severe pain yesterday and wanted to make sure everything was ok  She notes associated nausea, which she states is her usual since becoming pregnant, but denies any vomiting  Patient denies any lightheadedness, dizziness, dysuria, urinary frequency or urgency, hematuria, vaginal irritation or discharge, or diarrhea  Patient also denies any fevers, chills, headaches, vision changes, cough, shortness of breath, chest pain  Patient was seen here on  for threatened miscarriage at which time a vaginal U/S was completed confirming IUP and 5 weeks, 5 days gestation  Patient has not seen the OBGYN yet, but has an appointment scheduled for next week  Prior to Admission Medications   Prescriptions Last Dose Informant Patient Reported? Taking?    NUVARING 0 12-0 015 MG/24HR vaginal ring  Self Yes No   Sig: Insert 1 each into the vagina every 28 days    hydrochlorothiazide (HYDRODIURIL) 25 mg tablet   No No   Sig: Take 0 5 tablets (12 5 mg total) by mouth daily   Patient not taking: Reported on 2019   ibuprofen (MOTRIN) 600 mg tablet  Self No No   Sig: Take 1 tablet (600 mg total) by mouth every 6 (six) hours as needed for mild pain   Patient not taking: Reported on 2019      Facility-Administered Medications: None       Past Medical History:   Diagnosis Date    Anxiety        Past Surgical History:   Procedure Laterality Date     SECTION      WISDOM TOOTH EXTRACTION         Family History   Problem Relation Age of Onset    No Known Problems Mother     Hypertension Father      I have reviewed and agree with the history as documented  Social History     Tobacco Use    Smoking status: Former Smoker     Last attempt to quit: 2019     Years since quittin 3    Smokeless tobacco: Never Used    Tobacco comment: socially- 1 cigarette a week   Substance Use Topics    Alcohol use: Never     Frequency: Never     Comment: rarely    Drug use: No        Review of Systems   Constitutional: Negative for chills and fever  HENT: Negative for congestion and sore throat  Eyes: Negative for visual disturbance  Respiratory: Negative for cough, shortness of breath, wheezing and stridor  Cardiovascular: Negative for chest pain  Gastrointestinal: Positive for abdominal pain and nausea  Negative for diarrhea and vomiting  Genitourinary: Positive for vaginal bleeding  Negative for difficulty urinating, dysuria, flank pain, hematuria, urgency, vaginal discharge and vaginal pain  Musculoskeletal: Negative for myalgias, neck pain and neck stiffness  Skin: Negative for color change, pallor and rash  Neurological: Negative for dizziness, weakness, light-headedness, numbness and headaches  All other systems reviewed and are negative  Physical Exam  Physical Exam   Constitutional: She is oriented to person, place, and time  She appears well-developed and well-nourished  Non-toxic appearance  She does not have a sickly appearance  She does not appear ill  No distress  Patient appears well, no acute distress, non-toxic appearing   HENT:   Head: Normocephalic and atraumatic     Right Ear: Tympanic membrane, external ear and ear canal normal    Left Ear: Tympanic membrane, external ear and ear canal normal    Nose: Nose normal    Mouth/Throat: Uvula is midline, oropharynx is clear and moist and mucous membranes are normal  No oropharyngeal exudate  Eyes: Pupils are equal, round, and reactive to light  Conjunctivae and EOM are normal    Neck: Normal range of motion  Neck supple  Cardiovascular: Normal rate, regular rhythm, normal heart sounds and intact distal pulses  Pulmonary/Chest: Effort normal and breath sounds normal  No stridor  No respiratory distress  She has no wheezes  Abdominal: Soft  Bowel sounds are normal  She exhibits no distension  There is tenderness in the suprapubic area  There is no rigidity, no rebound, no guarding and no CVA tenderness  Musculoskeletal: Normal range of motion  Neurological: She is alert and oriented to person, place, and time  Skin: Skin is warm and dry  Capillary refill takes less than 2 seconds  She is not diaphoretic  Psychiatric: She has a normal mood and affect  Her behavior is normal    Nursing note and vitals reviewed        Vital Signs  ED Triage Vitals [06/25/19 1135]   Temperature Pulse Respirations Blood Pressure SpO2   98 8 °F (37 1 °C) 91 16 122/74 100 %      Temp Source Heart Rate Source Patient Position - Orthostatic VS BP Location FiO2 (%)   Temporal Monitor Sitting Right arm --      Pain Score       5           Vitals:    06/25/19 1135 06/25/19 1408   BP: 122/74 128/74   Pulse: 91 85   Patient Position - Orthostatic VS: Sitting Sitting         Visual Acuity      ED Medications  Medications - No data to display    Diagnostic Studies  Results Reviewed     Procedure Component Value Units Date/Time    hCG, quantitative [[id number]]  (Abnormal) Collected:  06/25/19 1210    Lab Status:  Final result Specimen:  Blood from Arm, Left Updated:  06/25/19 1413     HCG, Quant 78,278 0 mIU/mL     Narrative:        Expected Ranges:     Approximate               Approximate HCG  Gestation age          Concentration ( mIU/mL)  _____________          ______________________   [facility]                      HCG values  0 2-1                       5-50  1-2                           2-3                         100-5000  3-4                         500-11509  4-5                         1000-44815  5-6                         [id number]  6-8                         12304-188267  8-12                        [id number]      CBC and differential [[id number]]  (Abnormal) Collected:  06/25/19 1245    Lab Status:  Final result Specimen:  Blood from Arm, Left Updated:  06/25/19 1250     WBC 10 93 Thousand/uL      RBC 3 96 Million/uL      Hemoglobin 12 5 g/dL      Hematocrit 37 0 %      MCV 93 fL      MCH 31 6 pg      MCHC 33 8 g/dL      RDW 11 9 %      MPV 10 4 fL      Platelets 293 Thousands/uL      nRBC 0 /100 WBCs      Neutrophils Relative 77 %      Immat GRANS % 1 %      Lymphocytes Relative 13 %      Monocytes Relative 8 %      Eosinophils Relative 1 %      Basophils Relative 0 %      Neutrophils Absolute 8 44 Thousands/µL      Immature Grans Absolute 0 06 Thousand/uL      Lymphocytes Absolute 1 38 Thousands/µL      Monocytes Absolute 0 91 Thousand/µL      Eosinophils Absolute 0 12 Thousand/µL      Basophils Absolute 0 02 Thousands/µL     Comprehensive metabolic panel [[id number]]  (Abnormal) Collected:  06/25/19 1210    Lab Status:  Final result Specimen:  Blood from Arm, Left Updated:  06/25/19 1250     Sodium 136 mmol/L      Potassium 4 6 mmol/L      Chloride 102 mmol/L      CO2 26 mmol/L      ANION GAP 8 mmol/L      BUN 9 mg/dL      Creatinine 0 49 mg/dL      Glucose 100 mg/dL      Calcium 9 2 mg/dL      AST 32 U/L      ALT 15 U/L      Alkaline Phosphatase 57 U/L      Total Protein 7 6 g/dL      Albumin 3 5 g/dL      Total Bilirubin 0 58 mg/dL      eGFR 130 ml/min/1 73sq m     Narrative:       [place] guidelines for Chronic Kidney Disease (CKD):     Stage 1 with normal or high GFR (GFR > 90 mL/min/1 73 square meters)    Stage 2 Mild CKD (GFR = 60-89 mL/min/1 73 square meters)    Stage 3A Moderate CKD (GFR = 45-59 mL/min/1 73 square meters)    Stage 3B Moderate CKD (GFR = 30-44 mL/min/1 73 square meters)    Stage 4 Severe CKD (GFR = 15-29 mL/min/1 73 square meters)    Stage 5 End Stage CKD (GFR <15 mL/min/1 73 square meters)  Note: GFR calculation is accurate only with a steady state creatinine                 No orders to display              Procedures  Procedures       ED Course  ED Course as of Jun 26 1104   Tue Jun 25, 2019   1233 Dr Selma Mcneil performed bedside U/S  Confirmed IUP and HR>140  Awaiting labs      1500 Patient is Rh+, no need for Rhogam, and beta quant WNL for 8 weeks gestation  Patient states bleeding has stopped at this point  Patient is stable for d/c with close f/u with OB                                  MDM  Number of Diagnoses or Management Options  Lower abdominal pain:   Vaginal bleeding in pregnancy, first trimester:   Diagnosis management comments: Discussed results with patient  Recommended repeat beat hcg quant in 2 days and follow-up with OB as soon as possible for further evaluation and review of repeat beta  Provided education on vaginal bleeding in first trimester and threatened miscarriage  Reviewed symptomatic treatment of abdominal cramping at home  Reviewed red flag symptoms and return to ED instructions  Patient notes understanding and agrees to plan  Disposition  Final diagnoses:   Vaginal bleeding in pregnancy, first trimester   Lower abdominal pain     Time reflects when diagnosis was documented in both MDM as applicable and the Disposition within this note     Time User Action Codes Description Comment    6/25/2019  2:48 PM Earline Nguyen Add [O20 9] Vaginal bleeding in pregnancy, first trimester     6/25/2019  2:48 PM Wendy, [facility] [address] [R10 30] Lower abdominal pain       ED Disposition     ED Disposition Condition Date/Time Comment    Discharge Stable Tue Jun 25, 2019  2:48 PM Phuc Jacobo discharge to home/self care  Follow-up Information     Follow up With Specialties Details Why Contact Info Additional Information    Lorraine Yin, DO Internal Medicine  As needed [place] [facility]       [address] Emergency Department Emergency Medicine  If symptoms worsen [place] [id number] [phone] [place] ED, [address] [facility] , [place], [place], [address] [facility]cology Call today  [place] [phone]  [address] [employer] , [address], [place], [place], [address]          Discharge Medication List as of 6/25/2019  3:01 PM      CONTINUE these medications which have NOT CHANGED    Details   hydrochlorothiazide (HYDRODIURIL) 25 mg tablet Take 0 5 tablets (12 5 mg total) by mouth daily, Starting Wed 4/17/2019, Normal      ibuprofen (MOTRIN) 600 mg tablet Take 1 tablet (600 mg total) by mouth every 6 (six) hours as needed for mild pain, Starting Sun 4/7/2019, Print      NUVARING 0 12-0 015 MG/24HR vaginal ring Insert 1 each into the vagina every 28 days , Starting Tue 3/5/2019, Historical Med           No discharge procedures on file      ED Provider  Electronically Signed by           Aman Guardado PA-C  06/26/19 7580

## 2019-06-25 NOTE — DISCHARGE INSTRUCTIONS
Call [facility] clinic today to have repeat beta hcg, quantitative done in 2 days  Follow-up with OBGYN as previously scheduled  Rest and tylenol as previously prescribed as needed for pain management  Return to ED if symptoms worsen including worsening abdominal pain or vaginal bleeding

## 2019-06-27 ENCOUNTER — OFFICE VISIT (OUTPATIENT)
Dept: OBGYN CLINIC | Facility: CLINIC | Age: 29
End: 2019-06-27

## 2019-06-27 ENCOUNTER — TRANSCRIBE ORDERS (OUTPATIENT)
Dept: ADMINISTRATIVE | Facility: HOSPITAL | Age: 29
End: 2019-06-27

## 2019-06-27 VITALS
DIASTOLIC BLOOD PRESSURE: 86 MMHG | HEART RATE: 88 BPM | SYSTOLIC BLOOD PRESSURE: 132 MMHG | WEIGHT: 176.4 LBS | BODY MASS INDEX: 30.28 KG/M2

## 2019-06-27 DIAGNOSIS — O20.0 ABORTION, THREATENED, EARLY PREGNANCY: Primary | ICD-10-CM

## 2019-06-27 DIAGNOSIS — N93.9 VAGINAL BLEEDING: ICD-10-CM

## 2019-06-27 DIAGNOSIS — N93.9 VAGINAL BLEEDING: Primary | ICD-10-CM

## 2019-06-27 DIAGNOSIS — Z34.90 EARLY STAGE OF PREGNANCY: Chronic | ICD-10-CM

## 2019-06-27 PROCEDURE — 99214 OFFICE O/P EST MOD 30 MIN: CPT | Performed by: OBSTETRICS & GYNECOLOGY

## 2019-07-11 ENCOUNTER — OFFICE VISIT (OUTPATIENT)
Dept: OBGYN CLINIC | Facility: CLINIC | Age: 29
End: 2019-07-11

## 2019-07-11 VITALS
SYSTOLIC BLOOD PRESSURE: 128 MMHG | HEART RATE: 92 BPM | WEIGHT: 171 LBS | HEIGHT: 65 IN | DIASTOLIC BLOOD PRESSURE: 80 MMHG | BODY MASS INDEX: 28.49 KG/M2

## 2019-07-11 DIAGNOSIS — Z11.3 SCREEN FOR STD (SEXUALLY TRANSMITTED DISEASE): Primary | ICD-10-CM

## 2019-07-11 DIAGNOSIS — Z3A.10 10 WEEKS GESTATION OF PREGNANCY: ICD-10-CM

## 2019-07-11 PROCEDURE — 87591 N.GONORRHOEAE DNA AMP PROB: CPT | Performed by: OBSTETRICS & GYNECOLOGY

## 2019-07-11 PROCEDURE — 87491 CHLMYD TRACH DNA AMP PROBE: CPT | Performed by: OBSTETRICS & GYNECOLOGY

## 2019-07-11 PROCEDURE — 99213 OFFICE O/P EST LOW 20 MIN: CPT | Performed by: OBSTETRICS & GYNECOLOGY

## 2019-07-11 NOTE — PROGRESS NOTES
Assessment & Plan  30 y o  [id number] at Unknown presenting w/ vaginal discharge/bleeding  FHTs appreciated  GC/Chlamydia collected  Problem List Items Addressed This Visit     None      Visit Diagnoses     Screen for STD (sexually transmitted disease)    -  Primary    Relevant Orders    Chlamydia/GC amplified DNA by PCR    10 weeks gestation of pregnancy        Relevant Orders    Ambulatory Referral to Maternal Fetal Medicine        ____________________________________________________________  Subjective  She is presents for concern for brown discharge and maybe vaginal bleeding  It has been going on and off for a few weeks  Denies abdominal cramping  Patient is scheduled for her nurse intake on 7/15  She has a hx of pre-eclampsia and was counseled today on taking aspirin 162mg  Patient declines this intervention  She is concerned about taking medications while pregnant because her son has autism and she feels it might be due to something she took while pregnant  She also declines taking prenatal vitamins  Objective  /80   Pulse 92   Ht 5' 5" (1 651 m)   Wt 77 6 kg (171 lb)   LMP 05/01/2019   Breastfeeding?  No   BMI 28 46 kg/m²   FHR: 150s    : brown discharge from cervical os, no active bleeding noted until after GC/chlamydia sample taken; no other discharge noted      Stefanie Estes MD  OB/GYN PGY-2  7/11/2019  6:47 PM

## 2019-07-15 LAB
C TRACH DNA SPEC QL NAA+PROBE: NEGATIVE
N GONORRHOEA DNA SPEC QL NAA+PROBE: NEGATIVE

## 2019-07-24 NOTE — PATIENT INSTRUCTIONS
Thank you for choosing [address] for your  care today  If you have any questions about your ultrasound or care, please do not hesitate to contact us or your primary obstetrician

## 2019-07-25 ENCOUNTER — ROUTINE PRENATAL (OUTPATIENT)
Dept: PERINATAL CARE | Facility: CLINIC | Age: 29
End: 2019-07-25
Payer: COMMERCIAL

## 2019-07-25 VITALS
SYSTOLIC BLOOD PRESSURE: 126 MMHG | WEIGHT: 182.2 LBS | HEIGHT: 65 IN | BODY MASS INDEX: 30.35 KG/M2 | DIASTOLIC BLOOD PRESSURE: 73 MMHG | HEART RATE: 89 BPM

## 2019-07-25 DIAGNOSIS — O09.291 HX OF PREECLAMPSIA, PRIOR PREGNANCY, CURRENTLY PREGNANT, FIRST TRIMESTER: Primary | ICD-10-CM

## 2019-07-25 DIAGNOSIS — O20.9 FIRST TRIMESTER BLEEDING: ICD-10-CM

## 2019-07-25 DIAGNOSIS — Z36.82 ENCOUNTER FOR ANTENATAL SCREENING FOR NUCHAL TRANSLUCENCY: ICD-10-CM

## 2019-07-25 DIAGNOSIS — Z3A.12 12 WEEKS GESTATION OF PREGNANCY: ICD-10-CM

## 2019-07-25 DIAGNOSIS — G93.2 PSEUDOTUMOR CEREBRI SYNDROME: ICD-10-CM

## 2019-07-25 PROCEDURE — 76813 OB US NUCHAL MEAS 1 GEST: CPT | Performed by: OBSTETRICS & GYNECOLOGY

## 2019-07-25 PROCEDURE — 76801 OB US < 14 WKS SINGLE FETUS: CPT | Performed by: OBSTETRICS & GYNECOLOGY

## 2019-07-25 PROCEDURE — 99241 PR OFFICE CONSULTATION NEW/ESTAB PATIENT 15 MIN: CPT | Performed by: OBSTETRICS & GYNECOLOGY

## 2019-07-25 RX ORDER — ASPIRIN 81 MG/1
81 TABLET, CHEWABLE ORAL DAILY
COMMUNITY

## 2019-07-25 NOTE — LETTER
July 26, 2019     [employer] PROVIDER    Patient: Wilbert Jaimes   YOB: 1990   Date of Visit: 7/25/2019       Dear   Provider: Thank you for referring Wilbert Jaimes to me for evaluation  Below are my notes for this consultation  If you have questions, please do not hesitate to call me  I look forward to following your patient along with you  Sincerely,        Danuta Rodriguez MD        CC: No Recipients  Danuta Rodriguez MD  7/26/2019  8:23 AM  Sign at close encounter  Please refer to the [facility] ultrasound report in Ob Procedures for additional information regarding the visit to the [facility]  today

## 2019-07-26 PROBLEM — O09.291 HX OF PREECLAMPSIA, PRIOR PREGNANCY, CURRENTLY PREGNANT, FIRST TRIMESTER: Status: ACTIVE | Noted: 2019-07-26

## 2019-07-29 ENCOUNTER — TELEPHONE (OUTPATIENT)
Dept: PERINATAL CARE | Facility: CLINIC | Age: 29
End: 2019-07-29

## 2019-07-30 ENCOUNTER — TELEPHONE (OUTPATIENT)
Dept: PERINATAL CARE | Facility: CLINIC | Age: 29
End: 2019-07-30

## 2019-07-30 NOTE — TELEPHONE ENCOUNTER
----- Message from Fernanda Lr MD sent at 7/30/2019  8:45 AM EDT -----  I reviewed the lab study today and the results are normal

## 2019-07-30 NOTE — LETTER
07/30/19  Sayda Garrett  1990    Thank you for completing Part 1 of your Sequential Screen  To obtain a complete test result, please complete blood work for Part 2 Sequential Screen between the weeks of 8/16/2019 to 8/30/2019  Based on your insurance coverage, please use one of the following locations  Call our office for any questions at [phone]      [facility]   [address] [facility], [facility], [address]    Phone: [address] [facility] [address]  [address] [facility], [address]    Phone: [address], [address] [facility]  Phone: [phone] [address] [facility], [address]   Phone: [phone] (*ask for lab)    [address]  [address], [facility], [address] [facility]  Phone: [phone]  Hours: Monday-Friday 6a-6p, Saturday 7a-12p    [address] [facility],[address]  [facility], [address] [facility]; [employer], [address]   Phone: [address] [employer] [address]  [address] [facility] [address]   Phone: [phone]    Sincerely,    Asher Stephenson RN

## 2019-08-01 ENCOUNTER — OFFICE VISIT (OUTPATIENT)
Dept: PERINATAL CARE | Facility: CLINIC | Age: 29
End: 2019-08-01
Payer: COMMERCIAL

## 2019-08-01 DIAGNOSIS — O35.2XX0 HEREDITARY DISEASE IN FAMILY POSSIBLY AFFECTING FETUS, AFFECTING MANAGEMENT OF MOTHER IN PREGNANCY, SINGLE OR UNSPECIFIED FETUS: Primary | ICD-10-CM

## 2019-08-01 DIAGNOSIS — Z31.5 ENCOUNTER FOR PROCREATIVE GENETIC COUNSELING: ICD-10-CM

## 2019-08-01 PROCEDURE — 99205 OFFICE O/P NEW HI 60 MIN: CPT

## 2019-08-01 NOTE — PROGRESS NOTES
Genetic Counseling   High-Risk Gestation Note    Appointment Date:  8/1/2019  Referred By: Shaheen Castañeda,*  YOB: 1990  Partner:  Shantell Oconnor     Indication for Visit:  personal and/or family history of genetic disorder:  Phenylketonuria (PKU) and a child with autism  Pregnancy History: [id number]  Estimated Date of Delivery: 01/31/20  Estimated Gestational Age: 15w0d    Genetic Counseling: Leana Crooks is a 30year old female who is here to discuss risk due to a previous child with autism and family history of PKU  Issues Discussed:  average population risk- 3-4% in the average pregnancy of serious condition or birth defect  2-3% risk of mental retardation  Not all detected by prenatal testing  Clinical course and variability of PKU and autism  Mode of inheritance/mechanism:  Autosomal recessive for PKU and multifactorial for autism  Risk to future pregnancies:  5% risk for autism  Options Discussed:  Amniocentesis-risks and limitations discussed  Level II ultrasound to screen for structural anomalies  Multiple marker serum screen  Carrier screening for PKU  Additional Information / Impression:  Leana Crooks is a 30 y o  female who presented with her partner for genetic counseling to discuss a previous child with autism  The patient states that her son was just recently diagnosed at age 11, by Developmental Pediatric[facility] at [facility]  Fragile X testing and microarray analysis were recommended for him which the patient stated they were planning on doing today after her appointment  We reviewed the general population risk for a diagnosis of autism, which is estimated at approximately 1/  We also reviewed the possible etiologies of autism, including multifactorial and genetic  Autism may be secondary to a chromosomal abnormality or single gene disorder; a genetic cause can now be identified in up to approximately 35-40% of cases    However there are over 800 genes associated with autism that have been identified to date, making a prenatal diagnosis and risk assessment difficult without records on the affected individual   Empiric recurrence risks for one previous affected sibling is about 10% (5% for half sibling)  We discussed that if her son's testing is negative, there is still increased risk for the pregnancy to also have autism as there are limitations to the tests ordered  Should one of the blood tests be positive prenatal diagnosis via amniocentesis can be done for the familial mutation  We also reviewed the availability for maternal testing for Fragile X and microdeletions/duplications depending on the results for her son  Allen Devi stated she would be interested in maternal blood work, thus we made a plan for me to contact her regarding further testing and screening once her son's results are available  Histories were taken on the patient and her partner's families  Allen Devi reports two nieces from her brother who were diagnosed with Phenylketonuria (PKU) as infants  The patient stated that she is very familiar with PKU and declined the need to review the morbidity and mortality of the disease  We discussed the autosomal recessive inheritance pattern and the increased risk of also being a carrier like her brother  Allen Devi states that she may have had carrier screening done by her OB in [place] and stated she can try to get records  We discussed that if screening was not performed we can do screening for the familial mutation if her brother's test results are available  The patient signed a records release form to obtain her OB records  Further review of family history for the patient and her partner was noncontributory  The family history was not significant for other genetic diseases or disorders, intellectual disability, birth defects, fetal loss, or consanguinity  Patient reports being of [place] decent and that her partner is of [place] decent    She denies either of them having known Ashkenazi Religious ancestry  The benefits and limitations of Cystic fibrosis (CF), Spinal muscular atrophy (SMA), and expanded carrier screening was not discussed during session  They will be offered once the patient's sons results are available  Lastly, we discussed the fact that everyone in the general population regardless of age, family history, or medical background has approximately a 3-5% risk of having a child with some type of congenital anomaly, genetic disease or intellectual disability  Currently there are no tests available to rule out all birth defects or health problems  Nancy Pearl was provided with our contact information  I encouraged her to call with any questions or concerns  Time spent with Genetic Counselor: 45 minutes    Plan / Tests Ordered:  1) Sequential screen part 2 (to be done between 8/16-8/30)  2) Level II ultrasound at approximately 20 weeks gestation  3) PKU carrier screening if not previously performed

## 2019-08-07 ENCOUNTER — TELEPHONE (OUTPATIENT)
Dept: OBGYN CLINIC | Facility: CLINIC | Age: 29
End: 2019-08-07

## 2019-08-07 ENCOUNTER — OFFICE VISIT (OUTPATIENT)
Dept: OBGYN CLINIC | Facility: CLINIC | Age: 29
End: 2019-08-07

## 2019-08-07 VITALS — BODY MASS INDEX: 30.39 KG/M2 | WEIGHT: 182.6 LBS | SYSTOLIC BLOOD PRESSURE: 108 MMHG | DIASTOLIC BLOOD PRESSURE: 70 MMHG

## 2019-08-07 DIAGNOSIS — N30.01 ACUTE CYSTITIS WITH HEMATURIA: Primary | ICD-10-CM

## 2019-08-07 DIAGNOSIS — O23.42 URINARY TRACT INFECTION IN MOTHER DURING SECOND TRIMESTER OF PREGNANCY: Primary | ICD-10-CM

## 2019-08-07 DIAGNOSIS — O26.892 PREGNANCY HEADACHE IN SECOND TRIMESTER: ICD-10-CM

## 2019-08-07 DIAGNOSIS — R51.9 PREGNANCY HEADACHE IN SECOND TRIMESTER: ICD-10-CM

## 2019-08-07 PROBLEM — R30.0 DYSURIA DURING PREGNANCY: Status: ACTIVE | Noted: 2019-08-07

## 2019-08-07 LAB
SL AMB  POCT GLUCOSE, UA: NEGATIVE
SL AMB LEUKOCYTE ESTERASE,UA: ABNORMAL
SL AMB POCT BILIRUBIN,UA: ABNORMAL
SL AMB POCT BLOOD,UA: ABNORMAL
SL AMB POCT CLARITY,UA: CLEAR
SL AMB POCT COLOR,UA: ABNORMAL
SL AMB POCT KETONES,UA: NEGATIVE
SL AMB POCT NITRITE,UA: POSITIVE
SL AMB POCT PH,UA: 5
SL AMB POCT SPECIFIC GRAVITY,UA: 1.03
SL AMB POCT URINE PROTEIN: ABNORMAL
SL AMB POCT UROBILINOGEN: 0.2

## 2019-08-07 PROCEDURE — 81001 URINALYSIS AUTO W/SCOPE: CPT | Performed by: OBSTETRICS & GYNECOLOGY

## 2019-08-07 PROCEDURE — 87086 URINE CULTURE/COLONY COUNT: CPT | Performed by: OBSTETRICS & GYNECOLOGY

## 2019-08-07 PROCEDURE — 87077 CULTURE AEROBIC IDENTIFY: CPT | Performed by: OBSTETRICS & GYNECOLOGY

## 2019-08-07 PROCEDURE — 81002 URINALYSIS NONAUTO W/O SCOPE: CPT | Performed by: OBSTETRICS & GYNECOLOGY

## 2019-08-07 PROCEDURE — 99213 OFFICE O/P EST LOW 20 MIN: CPT | Performed by: OBSTETRICS & GYNECOLOGY

## 2019-08-07 PROCEDURE — 87186 SC STD MICRODIL/AGAR DIL: CPT | Performed by: OBSTETRICS & GYNECOLOGY

## 2019-08-07 RX ORDER — NITROFURANTOIN 25; 75 MG/1; MG/1
100 CAPSULE ORAL 2 TIMES DAILY
Qty: 14 CAPSULE | Refills: 0 | Status: SHIPPED | OUTPATIENT
Start: 2019-08-07 | End: 2019-08-14

## 2019-08-07 RX ORDER — RIBOFLAVIN (VITAMIN B2) 400 MG
400 TABLET ORAL DAILY
Qty: 30 TABLET | Refills: 1 | Status: SHIPPED | OUTPATIENT
Start: 2019-08-07 | End: 2019-09-19 | Stop reason: ALTCHOICE

## 2019-08-07 NOTE — PATIENT INSTRUCTIONS
Pregnancy at [address] to [address]:   You are now at the end of your first trimester and entering your second trimester  Morning sickness usually goes away by this time  You may have other symptoms such as fatigue, frequent urination, and headaches  You may have gained between 2 to 4 pounds by now  DISCHARGE INSTRUCTIONS:   Seek care immediately if:   · You have pain or cramping in your abdomen or low back  · You have heavy vaginal bleeding or clotting  · You pass material that looks like tissue or large clots  Collect the material and bring it with you  Contact your healthcare provider if:   · You cannot keep food or drinks down, and you are losing weight  · You have light bleeding  · You have chills or a fever  · You have vaginal itching, burning, or pain  · You have yellow, green, white, or foul-smelling vaginal discharge  · You have pain or burning when you urinate, less urine than usual, or pink or bloody urine  · You have questions or concerns about your condition or care  How to care for yourself at this stage of your pregnancy:   · Get plenty of rest   You may feel more tired than normal  You may need to take naps or go to bed earlier  · Manage nausea and vomiting  Avoid fatty and spicy foods  Eat small meals throughout the day instead of large meals  Edith may help to decrease nausea  Ask your healthcare provider about other ways of decreasing nausea and vomiting  · Eat a variety of healthy foods  Healthy foods include fruits, vegetables, whole-grain breads, low-fat dairy foods, beans, lean meats, and fish  Drink liquids as directed  Ask how much liquid to drink each day and which liquids are best for you  Limit caffeine to less than 200 milligrams each day  Limit your intake of fish to 2 servings each week  Choose fish low in mercury such as canned light tuna, shrimp, salmon, cod, or tilapia   Do not  eat fish high in mercury such as swordfish, tilefish, eddie mackerel, and shark  · Take prenatal vitamins as directed  Your need for certain vitamins and minerals, such as folic acid, increases during pregnancy  Prenatal vitamins provide some of the extra vitamins and minerals you need  Prenatal vitamins may also help to decrease the risk of certain birth defects  · Do not smoke  If you smoke, it is never too late to quit  Smoking increases your risk of a miscarriage and other health problems during your pregnancy  Smoking can cause your baby to be born too early or weigh less at birth  Ask your healthcare provider for information if you need help quitting  · Do not drink alcohol  Alcohol passes from your body to your baby through the placenta  It can affect your baby's brain development and cause fetal alcohol syndrome (FAS)  FAS is a group of conditions that causes mental, behavior, and growth problems  · Talk to your healthcare provider before you take any medicines  Many medicines may harm your baby if you take them when you are pregnant  Do not take any medicines, vitamins, herbs, or supplements without first talking to your healthcare provider  Never use illegal or street drugs (such as marijuana or cocaine) while you are pregnant  Safety tips during pregnancy:   · Avoid hot tubs and saunas  Do not use a hot tub or sauna while you are pregnant, especially during your first trimester  Hot tubs and saunas may raise your baby's temperature and increase the risk of birth defects  · Avoid toxoplasmosis  This is an infection caused by eating raw meat or being around infected cat feces  It can cause birth defects, miscarriages, and other problems  Wash your hands after you touch raw meat  Make sure any meat is well-cooked before you eat it  Avoid raw eggs and unpasteurized milk  Use gloves or ask someone else to clean your cat's litter box while you are pregnant  Changes that are happening with your baby:   Your baby has fully formed fingernails and toenails  Your baby's heartbeat can now be heard  Ask your healthcare provider if you can listen to your baby's heartbeat  By week 14, your baby is over 4 inches long from the top of the head to the rump (baby's bottom)  Your baby weighs over 3 ounces  What you need to know about prenatal care:  During the first 28 weeks of your pregnancy, you will see your healthcare provider once a month  Prenatal care can help prevent problems during pregnancy and childbirth  Your healthcare provider will check your blood pressure and weight  You may also need any of the following:  · A urine test  may also be done to check for sugar and protein  These can be signs of gestational diabetes or infection  · Genetic disorders screening tests  may be offered to you  This screening test checks your baby's risk of genetic disorders such as Down syndrome  The screening test includes a blood test and ultrasound  · Your baby's heart rate  will be checked  © 2017 [employer] Information is for End User's use only and may not be sold, redistributed or otherwise used for commercial purposes  All illustrations and images included in CareNotes® are the copyrighted property of [employer]  or [employer]  The above information is an  only  It is not intended as medical advice for individual conditions or treatments  Talk to your doctor, nurse or pharmacist before following any medical regimen to see if it is safe and effective for you  Pregnancy at 11 to 14 Weeks   AMBULATORY CARE:   What changes are happening to your body: You are now at the end of your first trimester and entering your second trimester  Morning sickness usually goes away by this time  You may have other symptoms such as fatigue, frequent urination, and headaches  You may have gained between 2 to 4 pounds by now     Seek care immediately if:   · You have pain or cramping in your abdomen or low back     · You have heavy vaginal bleeding or clotting  · You pass material that looks like tissue or large clots  Collect the material and bring it with you  Contact your healthcare provider if:   · You cannot keep food or drinks down, and you are losing weight  · You have light bleeding  · You have chills or a fever  · You have vaginal itching, burning, or pain  · You have yellow, green, white, or foul-smelling vaginal discharge  · You have pain or burning when you urinate, less urine than usual, or pink or bloody urine  · You have questions or concerns about your condition or care  How to care for yourself at this stage of your pregnancy:   · Get plenty of rest   You may feel more tired than normal  You may need to take naps or go to bed earlier  · Manage nausea and vomiting  Avoid fatty and spicy foods  Eat small meals throughout the day instead of large meals  Edith may help to decrease nausea  Ask your healthcare provider about other ways of decreasing nausea and vomiting  · Eat a variety of healthy foods  Healthy foods include fruits, vegetables, whole-grain breads, low-fat dairy foods, beans, lean meats, and fish  Drink liquids as directed  Ask how much liquid to drink each day and which liquids are best for you  Limit caffeine to less than 200 milligrams each day  Limit your intake of fish to 2 servings each week  Choose fish low in mercury such as canned light tuna, shrimp, salmon, cod, or tilapia  Do not  eat fish high in mercury such as swordfish, tilefish, eddie mackerel, and shark  · Take prenatal vitamins as directed  Your need for certain vitamins and minerals, such as folic acid, increases during pregnancy  Prenatal vitamins provide some of the extra vitamins and minerals you need  Prenatal vitamins may also help to decrease the risk of certain birth defects  · Do not smoke  If you smoke, it is never too late to quit   Smoking increases your risk of a miscarriage and other health problems during your pregnancy  Smoking can cause your baby to be born too early or weigh less at birth  Ask your healthcare provider for information if you need help quitting  · Do not drink alcohol  Alcohol passes from your body to your baby through the placenta  It can affect your baby's brain development and cause fetal alcohol syndrome (FAS)  FAS is a group of conditions that causes mental, behavior, and growth problems  · Talk to your healthcare provider before you take any medicines  Many medicines may harm your baby if you take them when you are pregnant  Do not take any medicines, vitamins, herbs, or supplements without first talking to your healthcare provider  Never use illegal or street drugs (such as marijuana or cocaine) while you are pregnant  Safety tips during pregnancy:   · Avoid hot tubs and saunas  Do not use a hot tub or sauna while you are pregnant, especially during your first trimester  Hot tubs and saunas may raise your baby's temperature and increase the risk of birth defects  · Avoid toxoplasmosis  This is an infection caused by eating raw meat or being around infected cat feces  It can cause birth defects, miscarriages, and other problems  Wash your hands after you touch raw meat  Make sure any meat is well-cooked before you eat it  Avoid raw eggs and unpasteurized milk  Use gloves or ask someone else to clean your cat's litter box while you are pregnant  Changes that are happening with your baby: Your baby has fully formed fingernails and toenails  Your baby's heartbeat can now be heard  Ask your healthcare provider if you can listen to your baby's heartbeat  By week 14, your baby is over 4 inches long from the top of the head to the rump (baby's bottom)  Your baby weighs over 3 ounces  What you need to know about prenatal care:  During the first 28 weeks of your pregnancy, you will see your healthcare provider once a month   Prenatal care can help prevent problems during pregnancy and childbirth  Your healthcare provider will check your blood pressure and weight  You may also need any of the following:  · A urine test  may also be done to check for sugar and protein  These can be signs of gestational diabetes or infection  · Genetic disorders screening tests  may be offered to you  This screening test checks your baby's risk of genetic disorders such as Down syndrome  The screening test includes a blood test and ultrasound  · Your baby's heart rate  will be checked  © 2017 [employer] Information is for End User's use only and may not be sold, redistributed or otherwise used for commercial purposes  All illustrations and images included in CareNotes® are the copyrighted property of [employer]  or [employer]  The above information is an  only  It is not intended as medical advice for individual conditions or treatments  Talk to your doctor, nurse or pharmacist before following any medical regimen to see if it is safe and effective for you

## 2019-08-07 NOTE — ASSESSMENT & PLAN NOTE
Follow-up scheduled with Dr Kimberly Maxwell 8/27/19  Anesthesia consult in 3rd trimester  Continue HCTZ  Alarm signs reviewed: non-improving headache, vision changes, muscle weakness, FAST signs

## 2019-08-07 NOTE — ASSESSMENT & PLAN NOTE
Recommend Riboflavin 400 mg qday for prophylaxis  Reviewed safe use of tylenol in pregnancy, maximum dose of 3 grams per day  Reviewed safe use of fioricet in pregnancy though dose limited to 10 tablets, can be option if headaches persist

## 2019-08-07 NOTE — PROGRESS NOTES
Pseudotumor cerebri syndrome  Follow-up scheduled with Dr uSdhakar Moreland 19  Anesthesia consult in 3rd trimester  Continue HCTZ  Alarm signs reviewed: non-improving headache, vision changes, muscle weakness, FAST signs    Headache  Recommend Riboflavin 400 mg qday for prophylaxis  Reviewed safe use of tylenol in pregnancy, maximum dose of 3 grams per day  Reviewed safe use of fioricet in pregnancy though dose limited to 10 tablets, can be option if headaches persist    Hx of preeclampsia, prior pregnancy, currently pregnant  Continue daily aspirin prophylaxis  Protein: Creatine ratio ordered    14 weeks gestation of pregnancy  RTC next week for prenatal intake  Prenatal panel ordered     Dysuria during pregnancy  Follow-up urinalysis and urine culture     Marty Delong DO      30year old  at 14 weeks for prenatal problem visit  Reports severe headaches x 2 days, starting in the back of her head and radiating to the front  Reports associated spots in vision  However, currently without headache  Denies taking medication for headache, stating that she would take motrin for headaches outside of pregnancy and prefers not to use tylenol since she's concerned tylenol use in her previous pregnancy resulted in her son's autism diagnosis  She is also concerned that her blood pressure is elevated, as she was diagnosed with preeclampsia with severe features in a previous pregnancy, with severe feature being headaches  Also reporting that she has occasional pain with urination over the past few days, drinking cranberry juice for relief  Vitals:    19 1513   BP: 108/70     Physical Exam   Constitutional: She is oriented to person, place, and time  She appears well-developed and well-nourished  No distress  HENT:   Head: Normocephalic and atraumatic  Eyes: Pupils are equal, round, and reactive to light  Conjunctivae and EOM are normal    Neck: Normal range of motion  Neck supple     Neurological: She is alert and oriented to person, place, and time  She displays normal reflexes  No cranial nerve deficit  Coordination normal    Skin: She is not diaphoretic        FHT: 156 bpm

## 2019-08-08 LAB
AMORPH URATE CRY URNS QL MICRO: ABNORMAL /HPF
BACTERIA UR QL AUTO: ABNORMAL /HPF
BILIRUB UR QL STRIP: NEGATIVE
CAOX CRY URNS QL MICRO: ABNORMAL /HPF
CLARITY UR: ABNORMAL
COLOR UR: YELLOW
GLUCOSE UR STRIP-MCNC: NEGATIVE MG/DL
HGB UR QL STRIP.AUTO: ABNORMAL
KETONES UR STRIP-MCNC: NEGATIVE MG/DL
LEUKOCYTE ESTERASE UR QL STRIP: ABNORMAL
NITRITE UR QL STRIP: POSITIVE
NON-SQ EPI CELLS URNS QL MICRO: ABNORMAL /HPF
PH UR STRIP.AUTO: 5.5 [PH]
PROT UR STRIP-MCNC: ABNORMAL MG/DL
RBC #/AREA URNS AUTO: ABNORMAL /HPF
SP GR UR STRIP.AUTO: 1.03 (ref 1–1.03)
UROBILINOGEN UR QL STRIP.AUTO: 0.2 E.U./DL
WBC #/AREA URNS AUTO: ABNORMAL /HPF

## 2019-08-08 NOTE — PROGRESS NOTES
I have reviewed the notes, assessments, and/or procedures performed by Francisco Huerta, I concur with her documentation of Jose D Hennessy

## 2019-08-08 NOTE — TELEPHONE ENCOUNTER
Urine dip notable for + nitrates, leukocytes  Will treat with Macrobid 100 mg BID x 7 days  Will need test of cure at next prenatal visit  Called patient with results  Currently will decline macrobid, stating that she prefers to drink cranberry juice as she would prefer minimal medications in this pregnancy secondary to son's autism diagnosis  Reviewed risks of not treating urinary tract infection, notably pyelonephritis and subsequent risk of  delivery  States she will consider antibiotic treatment if dysuria persists      Alberto Ovalles DO

## 2019-08-10 LAB — BACTERIA UR CULT: ABNORMAL

## 2019-08-12 ENCOUNTER — INITIAL PRENATAL (OUTPATIENT)
Dept: OBGYN CLINIC | Facility: CLINIC | Age: 29
End: 2019-08-12

## 2019-08-12 VITALS
WEIGHT: 182 LBS | SYSTOLIC BLOOD PRESSURE: 130 MMHG | HEIGHT: 65 IN | BODY MASS INDEX: 30.32 KG/M2 | HEART RATE: 89 BPM | DIASTOLIC BLOOD PRESSURE: 80 MMHG

## 2019-08-12 DIAGNOSIS — Z34.92 PRENATAL CARE IN SECOND TRIMESTER: Primary | ICD-10-CM

## 2019-08-12 PROCEDURE — T1001 NURSING ASSESSMENT/EVALUATN: HCPCS

## 2019-08-12 NOTE — LETTER
Dentist Letter    Sayda Garrett  1990  [address] [facility]  [place] [address]  [id number]          08/12/19    We have had several requests from local dentist requesting permission to perform procedures on our patients who are pregnant  We wish to respond with this letter regarding some of the more routine procedures that we have been asked about  The following procedures may be performed on our obstetric patients:   1  Administration of local anesthesia   2  Administration of antibiotics such as PCN, Ampicillin, and Erythromycin  3  Administration of pain medications such as Tylenol, Tylenol with codeine, and if needed Percocet  4  Shielded X-rays    Should you have any questions, please do not hesitate to contact at [phone]          Sincerely,    [address]

## 2019-08-12 NOTE — PROGRESS NOTES
OBSTETRIC INTAKE VISIT    Jessica Marcelo presents today for initial OB intake at 14w5d  History obtained from patient and she reports it as follows:    Past Medical History:   Diagnosis Date    Anxiety     Pseudotumor cerebri syndrome      -Preeclampsia  Past Surgical History:   Procedure Laterality Date     SECTION      WISDOM TOOTH EXTRACTION       OB History    Para Term  AB Living   4 1 1   2 1   SAB TAB Ectopic Multiple Live Births   2       1      # Outcome Date GA Lbr Porter/2nd Weight Sex Delivery Anes PTL Lv   4 Current            3 2018 6w0d          2 Term 14 38w0d  4082 g (9 lb) M CS-Unspec   RUDDY      Complications: Pre-eclampsia   1 2013 6w0d            Social History     Tobacco Use    Smoking status: Former Smoker     Last attempt to quit: 2019     Years since quittin 4    Smokeless tobacco: Never Used    Tobacco comment: socially- 1 cigarette a week   Substance Use Topics    Alcohol use: Never     Frequency: Never     Comment: rarely    Drug use: No       Current Outpatient Medications:     aspirin 81 mg chewable tablet, Chew 81 mg daily, Disp: , Rfl:     nitrofurantoin (MACROBID) 100 mg capsule, Take 1 capsule (100 mg total) by mouth 2 (two) times a day for 7 days, Disp: 14 capsule, Rfl: 0    hydrochlorothiazide (HYDRODIURIL) 25 mg tablet, Take 0 5 tablets (12 5 mg total) by mouth daily (Patient not taking: Reported on 2019), Disp: 15 tablet, Rfl: 0    ibuprofen (MOTRIN) 600 mg tablet, Take 1 tablet (600 mg total) by mouth every 6 (six) hours as needed for mild pain (Patient not taking: Reported on 2019), Disp: 30 tablet, Rfl: 0    Riboflavin 400 MG TABS, Take 1 tablet (400 mg total) by mouth daily for 31 days (Patient not taking: Reported on 2019), Disp: 30 tablet, Rfl: 1    No Known Allergies    Review of Systems:  Denies vaginal bleeding or leaking of fluid  Denies uterine contractions or abdominal pain      Exam:  /80 Pulse 89   Ht 5' 5" (1 651 m)   Wt 82 6 kg (182 lb)   LMP 2019   Breastfeeding? No   BMI 30 29 kg/m²        Plan:  1  Will get initial OB labwork today  2  Return in this week for next prenatal H/P visit  3  Full OB physical and pelvic exam to be done at next visit   4  Referral placed for  consultation  5  Reviewed the following educational topics with patient:   -routine prenatal visit/ultrasound/labwork schedule   -nutritional demands of pregnancy, healthy dietary habits   -listeria, toxoplasmosis, seafood precautions   -weight gain expectations (based on pre-pregnant BMI)   -exercise, rest, and sexual activity during pregnancy   -abstinence from alcohol, tobacco, and illegal drugs   -common discomforts of pregnancy and appropriate management   -OTC medications safe to use in pregnancy   -genetic screening, Patient being seen at  center   [facility] referral   -symptoms to report to [facility] provider    -signs of PTL    -vaginal bleeding/leaking of fluid    -severe n/v-unable to tolerate ANY food/fluids for more than 24 hours  Pregnancy Essential book reviewed, patient oriented to office and all questions were answered

## 2019-08-12 NOTE — PATIENT INSTRUCTIONS
The First Trimester  (up through 14 weeks)   YOUR BABY   · Your baby starts to develop when your egg and a sperm come together  · Your baby grows inside your uterus (also called your womb)  It floats inside a bag of water - called the amniotic sac  The baby is connected to you by an umbilical cord which goes from the babys belly to the placenta  The placenta is attached to your uterus, and the placenta is where blood, oxygen, and food cross over from you to your baby  · Unhealthy things like drugs, alcohol, and tobacco can also cross over from you to your baby through the placenta  It is important to avoid these things as they can be harmful to how your baby develops and grows  · By the end of the first month, your babys heart is beating  The baby is about the size of a piece of rice  · By the end of the second month, all of you babys organ (heart, lungs, brain, skull) have completely formed  Now they just have to continue maturing and growing  The baby is about the size of a grape  · By the end of the third month, your baby is about 4 inches long! YOUR BODY   · Your period stops - this might be the first sign that you have that you are pregnant   · Your breasts may become sore and tender  · You may feel very tired  · You may have an upset stomach with nausea and/or vomiting which can occur at any time of day - or sometimes all day long  · You may feel baugh and cranky  You may also feel afraid or happy  This is all normal and natural!   · You may lose or gain weight  This is okay as well, as long as you are not losing or gaining a lot of weight          The Second Trimester (14-28 weeks)   YOUR BABY   * 4th month   · your baby has eyelashes and eyebrows   · your baby kicks, moves, and swallows   · your baby is 6 to 7 inches long   * 5th month   · your baby has fingernails   · your baby starts to having sleeping and awake cycles   · baby becomes very active (even though you may not always feel it)   · your baby is 8 to 12 inches long and weighs anywhere from ½ to 1 pound   * 6th month   · your babys eyes are almost completely formed and they will soon open and close   · babys skin is red and wrinkly and covered with a fine, soft hair called lanugo   · baby continues to be very active and you should be feeling it very well and very often   · your baby is 11 to 14 inches long     YOUR BODY   · Morning sickness usually starts to go away and women generally start to gaining weight more quickly  · You may start to get stretch marks on your belly and/or breasts which can be itchy  Softly rub lotion onto them to help relieve the itching  · Your vaginal discharge may become whitish in color  · You may become constipated  Try increasing fiber in your diet, or you may take a stool softener pill (such as Colace) to relieve the discomfort  · You may start to have heartburn  Medications like Tums or Maalox may help to relieve this  Try staying in a sitting position for at least an hour after meals to decrease heartburn  · You should try to get 8 hours of sleep at night, plus a nap during the day if possible  · You should not sit for longer than 2 hours without taking a break to stretch your legs  The Third Trimester  (28-42 weeks)  YOUR BABY   · your baby sucks its thumb now! · your baby can hear voices and respond to touch   so talk to him or her!!   · your babys brain grows and develops most in the last 2 months of pregnancy   · babys head and bones are soft and flexible so they can fit through the birth canal   · babys movements change towards the end of pregnancy because there is less room for kicking and stretching in your belly   · babys lungs are not fully developed and completely ready to breathe on their own until the last 3-4 weeks before your due date    YOUR BODY   · your belly is growing a lot now   · it may become more difficult to sleep well at night or to be as active as you usually are   · you may sweat more than usual   · you will become more off-balancebe careful not to fall!!   · you may develop hemorrhoids (which can be painful and make it difficult to sit down)   · the last two months of pregnancy can become very uncomfortablewith backaches, headaches, and heartburn   · you can start to have contractions  as long as they are irregular and less than 5 per hour, this is a normal part of your body getting ready to have a baby   · your cervix may start to thin out and open upto get ready for delivery   · you may find yourself needing to pee very often  because baby is pressing on your bladder so much   · you may get out of breathe more quickly than usual          Is my baby developing normally? GENETIC SCREENING    One of the concerns that many women have about their pregnancy is whether their baby is developing normally  There are various different tests that we can use try to help determine whether babies are developing normally or not  Some women have a higher risk for their baby to develop abnormally (sometimes called a birth defect), but it can happen to ANYONE  That is why we offer these tests to ALL women during their pregnancies  None of these tests are required  It is your choice which ones you choose to have done or choose to NOT have done during your pregnancy  Some of these tests are only available at a particular time during your pregnancy, so it is important to make decisions about what testing you desire early in the pregnancy  Here is some information about these different tests to help you make decisions about whether any of these tests are right for you       * ANATOMY ULTRASOUND: this ultrasound is done between 18 to 22 weeks of pregnancy and looks VERY closely at all of your babys parts and pieces to look for signs of any abnormal development; ultrasound is not perfect and CANNOT detect ALL types of birth defects (especially Down Syndrome) - but it is a very helpful test     * SEQUENTIAL SCREENING: this is actually a combination of tests which include an ultrasound measuring the back of your baby's neck between 11-13 weeks and bloodwork from you at that time and again between 15-22 weeks; this test can help tell us the risk for your baby to be affected by certain chromosome abnormalities or birth defects  * QUAD SCREEN: this test is done with just bloodwork from you between 15-22 weeks of pregnancy; it can help tell us the risk for your baby to be affected by certain chromosome abnormalities or birth defects  * CELL-FREE DNA SCREEN: this test is done with just bloodwork from you any time after 10 weeks of pregnancy; it is very accurate at telling us if your baby has a high chance for Down syndrome or other chromosome abnormalities but is generally reserved for women who have a high-risk factor for a baby with a chromosome abnormality  * MATERNAL SERUM ALPHA-FETO PROTEIN SCREEN:  this test is done with just bloodwork from you between 15-22 weeks of pregnancy; it helps us get an idea if your baby is developing a birth defect like spina bifida  * AMNIOCENTESIS: this test involves using a very thin needle inserted into your uterus to take out a little bit of the fluid around your baby for testing; it can be performed any time after 16 weeks of pregnancy; this test tells us for certain if your baby has particular birth defects (especially chromosome abnormalities); there is a very small risk for miscarriage to occur when you have this testing performed; this test is generally reserved for women who have a high-risk factor for a baby with an abnormality         Women who have a higher risk for babies to develop abnormally (sometimes called a birth defect) include women with the following:   · Age 28 years or older   · Obesity at the time of conception   · Diabetes at the time of conception   · A previous child with a birth defect   · Taking medications which may cause a birth defect     Here are some important questions to ask yourself when deciding which (if any) of these tests you want to have performed  · Do I want to know before birth if my baby has a birth defect? · What will I do with this information if the result shows a high chance for a birth defect? · How would learning about a birth defect help me make choices about my pregnancy? · Will these tests help me feel more reassured or just more anxious and afraid? Medications that are safe to take     Here is a list of medications which are safe for you to take during pregnancy without having to discuss with the nurse practitioner or physician:     * Tylenol/Acetaminophen (headache or fever)   * Benadryl/Diphenhydramine (allergies, runny nose, difficult sleeping, itching)   * Claritin/Loratidine (allergies, runny nose)   * Zyrtec/Cetirizine (allergies, runny nose)   * Allegra/Fexofenadine (allergies)  * Robitussin/Guaifenesin (coughing)   * Sudafed/Pseudoephedrine (runny nose/congestion)   * Colace/Docusate sodium (constipation)   * Maalox/Magnesium hydroxide (heartburn, indigestion)   * Zantac/Ranitidine (heartburn, indigestion)   * Pepcid/Famotidine (heartburn, indigestion)   * Tums/Calcium carbonate (heartburn, nausea)   * Kaopectate/Bismuth subsalicylate (diarrhea)   * Immodium/Loperamide (diarrhea)   * Vitamin B6/Pyrodoxine(nausea)   * Doxylamine/Unisom (nausea, insomnia)     Any other medications should be discussed with either our nurse practitioner or one of our physicians before taking  Nutrition in Pregnancy  Good Nutrition is a VERY important part of having a healthy pregnancy and healthy baby    You should follow a healthy diet which include the following:   · Vegetables (which are dark green and leafy): at least 2 servings each day   · Protein (meat, eggs, beans, nuts, peanut butter): 3-4 servings each day   · Breads/whole grains (bread, pasta, rice, tortillas, potatoes): 3 servings each day   · Dairy (milk, yogurt, cheese): 3-4 servings each day   · Water: 6-8 glasses per day   · Calories: approximately 2000 to 2200 calories per day     Weight Gain   Recommended weight gain for you during your pregnancy is based on your body mass index (BMI) at the time that you became pregnant  Pre-pregnant BMI Recommended weight gain   Underweight (BMI less than 18 5) 28 to 40 pounds   Normal weight (BMI 18 5-24 9) 25 to 35 pounds   Overweight (BMI 25-29 9) 15 to 25 pounds   Obese (BMI 30 or greater) 11 to 20 pounds     Food safety   It is VERY important to eat only safely-prepared foods during pregnancy as you and your baby have a higher risk than usual for being affected by foodborne illnesses  Follow these steps to ensure that you and your baby are safe from foodborne illnesses while you are pregnant:   · wash hands thoroughly with warm water and soap before and after handling any foods   · wash cutting boards, dishes, utensils, and countertops with hot water and soap before and after preparing any foods   · rinse raw fruits and vegetables thoroughly under running water before eating   · keep raw meat and seafood separate from other foods and use different cutting boards/utensils to handle raw meat than for other foods   · put cooked food on a freshly clean plate   · cook all of your foods thoroughly   · discard foods that have been left out for more than 2 hours   · refrigerate or freeze any foods than can spoil     There are three particular foodborne risks that you should be aware of and avoid as they can cause serious harm to your unborn child       * Listeria (a harmful bacteria)   · dont eat hot dogs or deli meats (unless theyre reheated until steaming hot)   · dont eat soft cheeses (such as Feta, Brie, Camembert) unless they are specifically labeled as being made with pasteurized milk   · dont drink raw (unpasteurized) milk   · dont eat refrigerated pates or meat spreads   · dont eat refrigerated smoked seafood unless its in a cooked dish like a casserole     * Mercury (a metal which is found in certain fish in high levels)   · dont eat shark, tilefish, eddie mackerel, or swordfish   · dont eat more than 12 ounces per week of shrimp, salmon, pollock, or catfish   · when eating tuna fish, you can have up to 6 ounces per week of canned albacore tuna OR up to 12 ounces of canned light tuna     * Toxoplasma (a harmful parasite)   · cook meat thoroughly before eating   · wear gloves when gardening or handling sand from a childs sandbox   · if you ha tve a cat, have someone else change the litter box while you are pregnant  · if you HAVE to clean it yourself, be sure to wash your hands thoroughly afterwards with warm water and soap  · dont get a NEW cat while you are pregnant          Exercise and Pregnancy     Exercise has many benefits for you  It:   · Improves your posture and appearance   · Relieves back pain   · Improves circulation   · Increases flexibility   · Decreases stress   · Helps you feel good and gives you a better self-image   · Gives you energy   · Helps you sleep   · Strengthens and stretches your muscles, which can help ease the pregnancy discomforts   · Increases your stamina and flexibility     The healthier you are going into labor, the faster and easier your recovery will be after birth  Exercise may be good for your baby too  Both of you feel calm and happy after a workout  Also, your unborn baby likes the motion  How much time to exercise:   Check with your health provider before you begin and make sure you are healthy enough to start  If you exercised before pregnancy, it is safe to do moderate exercise of 30 minutes or more every day  If not, start with 20 minutes a day, 3 times a week  If you were jogging before your pregnancy, it is safe to continue    If you arent used to jogging, pregnancy is not a good time to start  Your First Trimester   In the first three months you might feel tired and sick to your stomach  Only exercise when you feel up to it  But, even a little bit of activity can boost your energy  Consider a walk, stretch, or some yoga  Your Second Trimester   You might have more energy, so take advantage of the times when youre feeling good to do activities you enjoy  Safe exercises include low-impact aerobics (which elevate your heart rate), easy dancing, walking, swimming, stationary cycling, easy cross-country skiing, and yoga  Go easy on high-impact sports like running, tennis, or sports that could cause you to fall, like downhill skiing or horseback riding  Your Third Trimester   You might feel more tired and have backaches from the extra weight you are carrying  Your third trimester is an important time to use good posture, bend at the knees to pick things up, and not lift any heavy objects  Safety Rules   · Always stretch before and after working out  · Never workout so hard that you cant talk at the same time  · Dont exercise in very hot or very cold weather  · Drink plenty of water before, during and after exercise  · Be aware of your comfort level - stop what youre doing if you have pain, if you feel faint, or if youre becoming exhausted  · Avoid lying flat on your back after the first trimester as this position can decrease blood flow to your uterus  NAUSEA AND VOMITING IN PREGNANCY    1  Eat meals and snacks slowly  2  Eat every 1-2 hours to avoid a full stomach  3  Don't skip meals, avoid empty stomach  4  Eat a snack prior to getting out of bed  5  Avoid food and beverages with a strong smell  6  Avoid dehydration - drink enough fluid to keep your urine pale yellow  7  Drink fluids before a meal to minimize the effect of a full stomach  8  Limit the amount of coffee and beverages that contain caffeine    9  Eliminate spicy, odorous, high fat (fried foods), acidic (tomato products), and sweet foods  10  Fluid that contain lemon, mint, or orange can usually be well-tolerated  11  Snacks and meals that contain low-fat protein (lean meats, fish, poultry and eggs) along with eating easily digestible carbs (fruit, rice, toast, crackers and dry cereal) may be tolerated better  12  Foods with perez may be well-tolerated  Try using perez root powder, capsules, or extract (up to 1000mg per day)  13  Drink liquids in small amounts  14  Try taking Vitamin B6 (50mg at bedtime, 25mg in the morning, and 25mg in the afternoon) with Unisom/Doxylamine (25mg at bedtime)  15  If nausea and vomiting persist, please contact the office  SEX AND PREGNANCY    1  Is sex safe during pregnancy? Sex is usually perfectly safe throughout pregnancy and does not hurt your growing baby  Bearden and orgasms are fine unless you have a medical problem  If you are concerned, discuss it with your healthcare provider  2  How often is sex OK? Frequent sex should not hurt the baby if you are having a healthy pregnancy  3  Does sex cause miscarriage? There are no facts that link orgasms and miscarriage  However, if you have a history of miscarriage, your provider may caution you against sex and orgasm in your first trimester  4  What if I feel the baby move after sex? Is that a bad sign? No   The baby is well-protected in your uterus  And, the baby often moves a lot no matter what you do  5  Should my partner be putting weight on my abdomen? No, not as you get bigger  Find new positions for lovemaking as your pregnancy goes along  Try lying on your side or you be the one on top  Don't lie flat on your back  6  Can sex cause me to go into labor early? Not normally  However, orgasms cause the uterus to have mild contractions if you are near labor    If you have a history of early labor, your provider might warn you against intercourse and orgasm  Also, nipple stimulation causes your uterus to contract if you are near labor  Ask your provider if this is safe for you  7   Are all kinds of sex safe during pregnancy? No   Never let your partner blow air into your vagina  Do not put any object in your vagina that could cause injury or infection  If you have heavy bleeding or your water breaks during sex, stop and call your provider immediately  8  What if I don't feel like having sex? Be open about it with your partner  In early pregnancy, you may feel too tired or be sick to your stomach  In the last weeks of pregnancy, your physical changes may make you feel too large or too awkward for sex  You may also be thinking more about motherhood than sex by this time  However, in your middle months, you may actually want sex more often than usual   If your partner doesn't want sex for fear of hurting you or the baby, be reassuring that sex is safe and natural during pregnancy  9  Are there times I should avoid sex? Yes, if:  · Sex is painful  · You have unexplained vaginal bleeding  · Your have a low-lying placenta or placenta previa  · You have  labor  · Your water breaks  · You are pregnancy with twins or triplets  · You or your partner heave an unhealed herpes sore or any STD  · You can't get into a comfortable position            WARNING SIGNS DURING PREGNANCY    1  Vaginal bleeding  2  Sharp abdominal pain that does not go away  3  Fever (more than 100 4 and is not relieved by Tylenol)  4  Persistent vomiting lasting greater than 24 hours  5  Chest pain   6  Pain or burning when you urinate  7  Severe headache that doesn't resolve with Tylenol  8  Blurred vision or seeing spots in your vision  9  Sudden swelling of your face or hands  10  Redness, swelling or pain in a leg  11  A sudden weight gain in just a few days  12  Decrease in your baby's movement (after 28 weeks or the 6th month of pregnancy)  13   A loss of watery fluid from your vagina - can be a gush, a trickle or continuous wetness  14  After 20 weeks of pregnancy, rhythmic cramping (greater than 4 per hour) or menstrual like low/pelvic pain            BREASTFEEDING     BENEFITS FOR BABIES   * stronger immune systems (less allergies, eczema, asthma, and childhood cancers)   * less diarrhea and constipation or other GI diseases   * fewer colds and ear infections   * better vision and teeth (fewer cavities)   * improves IQ   * lower rates of diabetes and obesity in childhood     BENEFITS FOR MOMS   · promotes faster weight loss after delivery   · lower risk for postpartum depression   · lower risk for breast, uterine, and ovarian cancers   · lower risk for osteoporosis developing with age   · easier than formula - is always right with you, clean, and the right temperature   · less expensive than formulaits FREE !!!!     KEYS TO SUCCESSFUL BREASTFEEDING   · keep baby skin-to-skin until after first feeding event   · keep baby in your room with you during your hospital stay after delivery   · avoid any bottle feedings (unless medically necessary)   · limit the use of pacifiers and swaddling   · ask for help if you are having any issueslactation consultants (who specialize in breastfeeding) are available to help you   · a healthy diet for momeating a variety of foods and portions in moderation    THINGS YOU SHOULD KNOW ABOUT BREASTFEEDING   · most medications are considered compatible with breastfeeding by the [address] [place] [address] [employer], but you should check with your health care provider or lactation consultant prior to taking a new medicationjust to be sure it is safe   · alcohol (beer, wine, liquor) can be passed from mother to baby through breast milkan occasional, social drink is deemed acceptable by the [employer] [place] [employer]   more than that should be avoided   · breastfeeding is NOT an effective method of birth control   · nicotine (in cigarettes) can pass from mother to baby through breast milk   however, for mothers who smoke, it is still healthier to breastfeed than use formula   · caffeine should be limited to 1-3 cups per dayincludes coffee, soda, energy drinks           VACCINES IN PREGNANCY    TDAP  Whopping cough (or pertusSsis) can be serious for anyone, but for your , it can be life-threatning  Up to 20 babies die each year in the [place]  Due to whopping cough  About half of babies younger than 3year old who get whopping cough need treatment in the hospital   The younger the baby is when he or she gets whopping cough, the more likely he or she will need to be treated in a hospital   When you receive the whopping cough vaccine (Tdap) during your pregnancy, your body will create protective antibodies and pass some of them to your baby before birth  These antibodies can help protect your baby from getting whopping cough until they are old enough to be vaccinated themselves (usually around 7 months of age)  INFLUENZA  Changes in your immune, heart, and lung functions during pregnancy make you more likely to get seriously ill from the flu  Catching the flu also increases your chances for serious problems for your developing baby, including premature labor and delivery  It is recommended that all women who are pregnant during flu season should receive an influenza vaccine  SEAT BELT USE IN PREGNANCY    Whether you are pregnant or not, you should wear a seat belt EVERY time you are in a car  A seat belt is the best protection for both you AND your unborn child  To use a seat belt properly while you are pregnant, you may need to adjust the front seat several times as you grow so that there is always at least 10 inches between the center of your chest and the steering wheel or dashboard, yet still be able to comfortably reach the pedals    The shoulder belt should lay across your chest (between your breasts) and away from your neck   The lap belt should be secured BELOW your belly so that it fits snugly across your hips and pelvic bone  You should NOT disable the air bag on your vehicle  Seat belts and air bags work best when used together to protect you and your unborn child

## 2019-08-14 ENCOUNTER — PATIENT OUTREACH (OUTPATIENT)
Dept: OBGYN CLINIC | Facility: CLINIC | Age: 29
End: 2019-08-14

## 2019-08-14 DIAGNOSIS — Z78.9 NEED FOR FOLLOW-UP BY SOCIAL WORKER: Primary | ICD-10-CM

## 2019-08-14 NOTE — PROGRESS NOTES
OB/GYN  PRENATAL H&P VISIT  Junie Rivera  2019  5:59 PM  Dr Amadou Billy MD      SUBJECTIVE  Patient is a [id number] at 15w1d here for initial prenatal H&P  This is an unintended and desired pregnancy  FOB is involved, not currently present with patient  She is currently doing well  She works from home, is an , able to spend more time with autistic son  She has a hx of STD/STI- chlamydia and hpv with previous partner, denies a hx of TB or close contacts with persons with TB  She denies a family history of inheritable conditions such as physical or intellectual disabilities, birth defects, blood disorders, heart or neural tube defects  Son has autism, nieces have PKU  She has not had MRSA  She went to [facility] [place] in May, but denies travel planned in the near future  She denies use of nicotine or recreational drug use  She denies vaginal bleeding, cramping, leakage, abnormal discharge  Headaches are improved, but blurry vision is stable  She is still having some swelling in her legs and feels she is having fluctuations in her weight throughout the day  Dysuria is improved  Today is day  of Macrobid  She states with her previous pregnancy she had multiple UTIs  She did not take prenatal vitamins as she was worried the folic acid may pre-dispose her child to autism  She is very worried about doing anything in this pregnancy that could increase her risk for another autistic child  OB History    Para Term  AB Living   4 1 1 0 2 1   SAB TAB Ectopic Multiple Live Births   2 0 0 0 1      # Outcome Date GA Lbr Porter/2nd Weight Sex Delivery Anes PTL Lv   4 Current            3 2018 6w0d          2 Term 14 38w0d  4082 g (9 lb) M CS-Unspec   RUDDY      Complications: Pre-eclampsia   1 2013 6w0d              Review of Systems   Constitutional: Negative for fever  HENT: Negative for rhinorrhea, sinus pressure, sneezing and sore throat      Eyes: Negative for visual disturbance  Stable blurry vision     Respiratory: Negative for cough, shortness of breath and wheezing  Cardiovascular: Positive for leg swelling  Negative for chest pain and palpitations  Gastrointestinal: Negative for abdominal distention, abdominal pain, blood in stool, nausea and vomiting  Genitourinary: Negative for dysuria, flank pain, vaginal bleeding and vaginal discharge  Musculoskeletal: Negative for neck pain and neck stiffness  Skin: Negative for color change, pallor and rash  Neurological: Positive for headaches  Negative for light-headedness and numbness         Past Medical History:   Diagnosis Date    Anxiety     Preeclampsia     Pseudotumor cerebri syndrome        Past Surgical History:   Procedure Laterality Date     SECTION      WISDOM TOOTH EXTRACTION         Social History     Socioeconomic History    Marital status: Legally      Spouse name: Not on file    Number of children: Not on file    Years of education: Not on file    Highest education level: Not on file   Occupational History    Not on file   Social Needs    Financial resource strain: Somewhat hard    Food insecurity:     Worry: Never true     Inability: Never true    Transportation needs:     Medical: No     Non-medical: No   Tobacco Use    Smoking status: Former Smoker     Last attempt to quit: 2019     Years since quittin 4    Smokeless tobacco: Never Used    Tobacco comment: socially- 1 cigarette a week   Substance and Sexual Activity    Alcohol use: Never     Frequency: Never     Comment: rarely    Drug use: No    Sexual activity: Yes   Lifestyle    Physical activity:     Days per week: Not on file     Minutes per session: Not on file    Stress: Rather much   Relationships    Social connections:     Talks on phone: Not on file     Gets together: Not on file     Attends Zoroastrian service: Not on file     Active member of club or organization: Not on file Attends meetings of clubs or organizations: Not on file     Relationship status: Not on file    Intimate partner violence:     Fear of current or ex partner: Not on file     Emotionally abused: Not on file     Physically abused: Not on file     Forced sexual activity: Not on file   Other Topics Concern    Not on file   Social History Narrative    Not on file       OBJECTIVE  There were no vitals filed for this visit  Physical Exam   Constitutional: She appears well-developed and well-nourished  No distress  Genitourinary: Pelvic exam was performed with patient supine  There is no rash, tenderness, lesion or injury on the right labia  There is no rash, tenderness, lesion or injury on the left labia  HENT:   Head: Normocephalic and atraumatic  Eyes: Conjunctivae and EOM are normal    Neck: Normal range of motion  Neck supple  Cardiovascular: Normal rate, regular rhythm, normal heart sounds and intact distal pulses  Exam reveals no gallop and no friction rub  No murmur heard  Pulmonary/Chest: Effort normal and breath sounds normal  No stridor  No respiratory distress  She has no wheezes  She has no rales  She exhibits no tenderness  Abdominal: Soft  She exhibits no distension and no mass  There is no tenderness  There is no rebound and no guarding  No hernia  Musculoskeletal: Normal range of motion  She exhibits no edema or deformity  Neurological: She is alert  Skin: Skin is warm and dry  She is not diaphoretic  No erythema  ASSESSMENT AND PLAN    30 y o , [id number], with LMP 05/01/2019 , at 15w1d here for her prenatal H&P   by doppler    1  Pregnancy with history of pre-eclampsia in previous pregnancy: H&P completed today  PN Labs not yet completed, patient encouraged to complete today  Labor expectations discussed with patient, including appointment schedule, nutrition, exercise, medications, sexual intercourse, and nausea/vomiting  Patient's BMI is 28 6   Recommended weight gain is 15-20 lbs  On , trying to take regularly but sometimes forgets, P:C ratio not yet completed  Trying to avoid taking folic acid as is concerned it may have contributed to son's autism  2  Screening: Pap smear NILM with negative HPV 2019, will need repeat in   GC/CT collected  Sequential screening reviewed with patient - will proceed with second portion later in the pregnancy  3  Consents: Delivery process including potential OVD and  reviewed  Sign delivery consent form at 28 weeks  4  Labor: Has a history of a prior , would like to have repeat c section  5  Postpartum: Patient plans on breastfeeding  Different methods of contraception were discussed with patient, including progesterone only oral pills, depo provera, nexplanon, mirena, and paragard  Patient would like to use sterilization during postpartum phase  6  Follow up: RTC in 4 weeks  Precautions regarding labor, leakage, bleeding, and fetal movement reviewed  7  Pseudotumor cerebri syndrome  Headache improved, blurry vision at baseline  Follow-up scheduled with Dr Cam Bosworth 19  Anesthesia consult in 3rd trimester  Continue HCTZ  Alarm signs reviewed: non-improving headache, vision changes, muscle weakness, FAST signs    8  Dysuria, with e coli acute cystitis, and history of frequent UTIs in prior pregnancy, improved  She is on day 7/7 of Macrobid treatment  Trying to drink cranberry juice frequently  Discussed with patient that if she has recurrence of multiple UTIs may wish to start antibiotic prophylaxis   Plan to send test of cure at next appointment    Patient seen and discussed with Dr Ulises Linares MD  2019  5:59 PM

## 2019-08-14 NOTE — PROGRESS NOTES
Received request to contact patient regarding slightly elevated depression scale score  Patient reports she is very sad because her 11 y o  Son has recently been diagnosed with Autism  Patient reports that she resides with her boyfriend step- children and her son   She is currently pregnant and works from home  She reports that her boyfriend is supportive with care of children  Patient is interested in learning about resources in the community for Autism and several resources reviewed with her including [facility], [facility] and  [facility]  Patient  requests information regarding outpatient mental health for her son as well and information provided regarding local [place] contracted [place] [id number] Providers  Also reviewed outpatient [facility] options for patient  Supportive counseling provided to patient  She denies further needs at this time and will contact [facility] for support as needed  Will continue to be available to provide support

## 2019-08-15 ENCOUNTER — ROUTINE PRENATAL (OUTPATIENT)
Dept: OBGYN CLINIC | Facility: CLINIC | Age: 29
End: 2019-08-15

## 2019-08-15 VITALS
BODY MASS INDEX: 30.49 KG/M2 | HEART RATE: 85 BPM | WEIGHT: 183 LBS | SYSTOLIC BLOOD PRESSURE: 123 MMHG | DIASTOLIC BLOOD PRESSURE: 76 MMHG | HEIGHT: 65 IN

## 2019-08-15 DIAGNOSIS — O09.299 HX OF PREECLAMPSIA, PRIOR PREGNANCY, CURRENTLY PREGNANT: ICD-10-CM

## 2019-08-15 DIAGNOSIS — G93.2 PSEUDOTUMOR CEREBRI SYNDROME: Primary | ICD-10-CM

## 2019-08-15 DIAGNOSIS — Z3A.15 15 WEEKS GESTATION OF PREGNANCY: ICD-10-CM

## 2019-08-15 PROCEDURE — 87491 CHLMYD TRACH DNA AMP PROBE: CPT | Performed by: OBSTETRICS & GYNECOLOGY

## 2019-08-15 PROCEDURE — 87591 N.GONORRHOEAE DNA AMP PROB: CPT | Performed by: OBSTETRICS & GYNECOLOGY

## 2019-08-15 PROCEDURE — 99204 OFFICE O/P NEW MOD 45 MIN: CPT | Performed by: OBSTETRICS & GYNECOLOGY

## 2019-08-17 LAB
C TRACH DNA SPEC QL NAA+PROBE: NEGATIVE
N GONORRHOEA DNA SPEC QL NAA+PROBE: NEGATIVE

## 2019-08-19 ENCOUNTER — APPOINTMENT (OUTPATIENT)
Dept: LAB | Facility: HOSPITAL | Age: 29
End: 2019-08-19
Attending: OBSTETRICS & GYNECOLOGY
Payer: COMMERCIAL

## 2019-08-19 ENCOUNTER — TRANSCRIBE ORDERS (OUTPATIENT)
Dept: ADMINISTRATIVE | Facility: HOSPITAL | Age: 29
End: 2019-08-19

## 2019-08-19 ENCOUNTER — APPOINTMENT (OUTPATIENT)
Dept: LAB | Facility: HOSPITAL | Age: 29
End: 2019-08-19
Payer: COMMERCIAL

## 2019-08-19 DIAGNOSIS — Z34.92 SECOND TRIMESTER PREGNANCY: ICD-10-CM

## 2019-08-19 DIAGNOSIS — R51.9 PREGNANCY HEADACHE IN SECOND TRIMESTER: ICD-10-CM

## 2019-08-19 DIAGNOSIS — Z34.92 SECOND TRIMESTER PREGNANCY: Primary | ICD-10-CM

## 2019-08-19 DIAGNOSIS — O26.892 PREGNANCY HEADACHE IN SECOND TRIMESTER: ICD-10-CM

## 2019-08-19 LAB
ABO GROUP BLD: NORMAL
BASOPHILS # BLD AUTO: 0.03 THOUSANDS/ΜL (ref 0–0.1)
BASOPHILS NFR BLD AUTO: 0 % (ref 0–1)
BILIRUB UR QL STRIP: NEGATIVE
BLD GP AB SCN SERPL QL: NEGATIVE
CLARITY UR: CLEAR
COLOR UR: YELLOW
EOSINOPHIL # BLD AUTO: 0.2 THOUSAND/ΜL (ref 0–0.61)
EOSINOPHIL NFR BLD AUTO: 2 % (ref 0–6)
ERYTHROCYTE [DISTWIDTH] IN BLOOD BY AUTOMATED COUNT: 12.8 % (ref 11.6–15.1)
GLUCOSE UR STRIP-MCNC: NEGATIVE MG/DL
HCT VFR BLD AUTO: 34.9 % (ref 34.8–46.1)
HGB BLD-MCNC: 11.5 G/DL (ref 11.5–15.4)
HGB UR QL STRIP.AUTO: NEGATIVE
IMM GRANULOCYTES # BLD AUTO: 0.21 THOUSAND/UL (ref 0–0.2)
IMM GRANULOCYTES NFR BLD AUTO: 2 % (ref 0–2)
KETONES UR STRIP-MCNC: NEGATIVE MG/DL
LEUKOCYTE ESTERASE UR QL STRIP: NEGATIVE
LYMPHOCYTES # BLD AUTO: 1.71 THOUSANDS/ΜL (ref 0.6–4.47)
LYMPHOCYTES NFR BLD AUTO: 13 % (ref 14–44)
MCH RBC QN AUTO: 31.1 PG (ref 26.8–34.3)
MCHC RBC AUTO-ENTMCNC: 33 G/DL (ref 31.4–37.4)
MCV RBC AUTO: 94 FL (ref 82–98)
MONOCYTES # BLD AUTO: 0.78 THOUSAND/ΜL (ref 0.17–1.22)
MONOCYTES NFR BLD AUTO: 6 % (ref 4–12)
NEUTROPHILS # BLD AUTO: 10.17 THOUSANDS/ΜL (ref 1.85–7.62)
NEUTS SEG NFR BLD AUTO: 77 % (ref 43–75)
NITRITE UR QL STRIP: NEGATIVE
NRBC BLD AUTO-RTO: 0 /100 WBCS
PH UR STRIP.AUTO: 5.5 [PH]
PLATELET # BLD AUTO: 254 THOUSANDS/UL (ref 149–390)
PMV BLD AUTO: 10.6 FL (ref 8.9–12.7)
PROT UR STRIP-MCNC: NEGATIVE MG/DL
RBC # BLD AUTO: 3.7 MILLION/UL (ref 3.81–5.12)
RH BLD: POSITIVE
RUBV IGG SERPL IA-ACNC: >175 IU/ML
SP GR UR STRIP.AUTO: 1.02 (ref 1–1.03)
SPECIMEN EXPIRATION DATE: NORMAL
UROBILINOGEN UR QL STRIP.AUTO: 0.2 E.U./DL
WBC # BLD AUTO: 13.1 THOUSAND/UL (ref 4.31–10.16)

## 2019-08-19 PROCEDURE — 80081 OBSTETRIC PANEL INC HIV TSTG: CPT

## 2019-08-19 PROCEDURE — 81003 URINALYSIS AUTO W/O SCOPE: CPT

## 2019-08-19 PROCEDURE — 87086 URINE CULTURE/COLONY COUNT: CPT

## 2019-08-19 PROCEDURE — 36415 COLL VENOUS BLD VENIPUNCTURE: CPT

## 2019-08-19 PROCEDURE — 82570 ASSAY OF URINE CREATININE: CPT

## 2019-08-19 PROCEDURE — 84156 ASSAY OF PROTEIN URINE: CPT

## 2019-08-20 LAB
BACTERIA UR CULT: NORMAL
HBV SURFACE AG SER QL: NORMAL
HIV 1+2 AB+HIV1 P24 AG SERPL QL IA: NORMAL
RPR SER QL: NORMAL
SCAN RESULT: NORMAL

## 2019-08-21 LAB
CREAT UR-MCNC: 62.4 MG/DL
PROT UR-MCNC: <6 MG/DL
PROT/CREAT UR: <0.1 MG/G{CREAT} (ref 0–0.1)

## 2019-08-23 ENCOUNTER — TELEPHONE (OUTPATIENT)
Dept: PERINATAL CARE | Facility: CLINIC | Age: 29
End: 2019-08-23

## 2019-08-23 NOTE — TELEPHONE ENCOUNTER
Spoke with pt and provided her with the results of the part 2 Sequential Screen  Pt receptive and declines questions at this time

## 2019-08-23 NOTE — TELEPHONE ENCOUNTER
----- Message from Lonny Eubanks MD sent at 8/22/2019 12:18 PM EDT -----  I reviewed the lab study today and the results are normal
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
## 2019-08-27 ENCOUNTER — CONSULT (OUTPATIENT)
Dept: NEUROLOGY | Facility: CLINIC | Age: 29
End: 2019-08-27
Payer: COMMERCIAL

## 2019-08-27 VITALS
DIASTOLIC BLOOD PRESSURE: 69 MMHG | SYSTOLIC BLOOD PRESSURE: 107 MMHG | HEIGHT: 65 IN | WEIGHT: 188.7 LBS | RESPIRATION RATE: 16 BRPM | BODY MASS INDEX: 31.44 KG/M2 | HEART RATE: 95 BPM

## 2019-08-27 DIAGNOSIS — Z78.9 VEGETARIAN DIET: ICD-10-CM

## 2019-08-27 DIAGNOSIS — H53.8 BLURRED VISION, BILATERAL: ICD-10-CM

## 2019-08-27 DIAGNOSIS — G43.009 MIGRAINE WITHOUT AURA AND WITHOUT STATUS MIGRAINOSUS, NOT INTRACTABLE: Primary | ICD-10-CM

## 2019-08-27 PROCEDURE — 99244 OFF/OP CNSLTJ NEW/EST MOD 40: CPT | Performed by: PSYCHIATRY & NEUROLOGY

## 2019-08-27 NOTE — PROGRESS NOTES
Review of Systems   Constitutional: Positive for fatigue  Negative for appetite change and fever  HENT: Positive for tinnitus  Negative for hearing loss, trouble swallowing and voice change  Eyes: Positive for pain and visual disturbance (Blurred vision and double vision)  Negative for photophobia  Dry eyes   Respiratory: Negative  Negative for shortness of breath  Cardiovascular: Negative  Negative for palpitations  Gastrointestinal: Positive for nausea  Negative for vomiting  Endocrine: Negative  Negative for cold intolerance and heat intolerance  Genitourinary: Negative  Negative for dysuria, frequency and urgency  Musculoskeletal: Positive for arthralgias, back pain, gait problem (Balance problems), myalgias and neck pain  Skin: Negative  Negative for rash  Allergic/Immunologic: Negative  Neurological: Positive for dizziness, light-headedness, numbness (Facial) and headaches  Negative for tremors, seizures, syncope, facial asymmetry, speech difficulty and weakness  Tingling   Hematological: Negative  Does not bruise/bleed easily  Psychiatric/Behavioral: Negative  Negative for confusion, hallucinations and sleep disturbance

## 2019-08-27 NOTE — PATIENT INSTRUCTIONS
- The most important 1st thing to do is to follow up with Ophthalmology and determine if you have papilledema or not  (dilated fundus exam) - please call us with the results  Please contact me if you need as specific referral    If you do have it:  - evidence of current or old papilledema then I would recommend a spinal tap and we will schedule   - I recommend following vision closely through Ophthalmology while pregnant with assessments including visual field testing every 2-3 months    -In the setting of pregnancy, recommend no more than 20 lb weight gain  -If there is worsening visual function, there is an option for lumbar puncture as treatment    Any medication you take I would have a discussion with OB prior    Next time you get blood work get B12 to see if this is low    Headache/migraine treatment:   Abortive medications (for immediate treatment of a headache): It is ok to take  Acetaminophen if they help your headaches you should limit these to No more than 3 times a week to avoid medication overuse/rebound headaches       Other options that have been studied in animals and have failed to demonstrate risk to the fetus, although no adequate and well controlled studies in pregnant women include:  Metoclopramide/Reglan 10 mg  Diphenhydramine/Benadryl  Ondansetron/Zofran    Over the counter preventive supplements for headaches/migraines   (to take every day to help prevent headaches - not to take at the time of headache):  *I recommend multivitamin at least, B12 due to vegetarian diet  [] Magnesium 400mg daily (If any diarrhea or upset stomach, decrease dose  as tolerated)  [] Riboflavin (Vitamin B2) 400mg daily (adults)   (FYI B2 may make your urine bright/neon yellow)    Prescription preventive medications for headaches/migraines   (to take every day to help prevent headaches - not to take at the time of headache):  [x]   If we do diagnose idiopathic intracranial hypertension in the future  there have been observational case series that reported no adverse effects when acetazolamide was used in pregnancy, however there have been and will studies that have shown adverse fetal effects and there are no well-controlled studies and he min  However, potential benefits such as protection of vision may warrant use of this drug in pregnant women despite potential risks  Then depending on ophthalmology findings, would likely recommend resuming acetazolamide during breast-feeding     Lifestyle Recommendations:  [x] SLEEP - Maintain a regular sleep schedule: Adults need at least 7-8 hours of uninterrupted a night  Maintain good sleep hygiene:  Going to bed and waking up at consistent times, avoiding excessive daytime naps, avoiding caffeinated beverages in the evening, avoid excessive stimulation in the evening and generally using bed primarily for sleeping  One hour before bedtime would recommend turning lights down lower, decreasing your activity (may read quietly, listen to music at a low volume)  When you get into bed, should eliminate all technology (no texting, emailing, playing with your phone, iPad or tablet in bed)  [x] HYDRATION - Maintain good hydration  Drink  2L of fluid a day (4 typical small water bottles)  [x] DIET - Maintain good nutrition  In particular don't skip meals and try and eat healthy balanced meals regularly  [x] TRIGGERS - Look for other triggers and avoid them: Limit caffeine to 1-2 cups a day or less  Avoid dietary triggers that you have noticed bring on your headaches (this could include aged cheese, peanuts, MSG, aspartame and nitrates)  Education and Follow-up  [x] Please call with any questions or concerns  [x] Follow up in 2-3 months with Dr Josie Suazo   Follow up with Dr Aurelio Sarmiento     Of course if any new concerning symptoms go to the emergency department  Especially related to headaches and vision

## 2019-08-30 LAB — VIT B12 SERPL-MCNC: 273 PG/ML (ref 200–1100)

## 2019-09-12 ENCOUNTER — TELEPHONE (OUTPATIENT)
Dept: NEUROLOGY | Facility: CLINIC | Age: 29
End: 2019-09-12

## 2019-09-12 NOTE — TELEPHONE ENCOUNTER
pt called regarding b12 results  i made her aware that this was in the normal range  she is asking if you still want her to take b12 supplement and if she can increase the dose as she states that she is feeling very weak/fatigue    please advise  [phone] to leave a detailed message

## 2019-09-12 NOTE — TELEPHONE ENCOUNTER
Spoke with patient and relayed Dr Abraham Chaney results  I also informed the patient about increasing her vitamin B12 to 1000 mcg's to 2000 mcg's but check with her OB first  I also informed the patient that Dr Giovanna Hurst left a My Chart message which she stated she received and she checked with her OB and the OB stated that taking 1000 mcg of Vitamin B12 is safe to take

## 2019-09-12 NOTE — TELEPHONE ENCOUNTER
Yes although she is within the normal range of B12, she is on the lower end of normal and often neurologists recommend level above 400  I did message her on mychart 8/30/19 recommending her to take 1000 mcg  Sublingual and if she would like to go to 2000 mcg, I would just have her double check with her OB first to get their approval although I am guessing it would be fine to take higher dose

## 2019-09-19 ENCOUNTER — ROUTINE PRENATAL (OUTPATIENT)
Dept: PERINATAL CARE | Facility: CLINIC | Age: 29
End: 2019-09-19
Payer: COMMERCIAL

## 2019-09-19 VITALS
BODY MASS INDEX: 32.15 KG/M2 | WEIGHT: 193 LBS | DIASTOLIC BLOOD PRESSURE: 75 MMHG | SYSTOLIC BLOOD PRESSURE: 121 MMHG | HEIGHT: 65 IN | HEART RATE: 99 BPM

## 2019-09-19 DIAGNOSIS — Z36.86 ENCOUNTER FOR ANTENATAL SCREENING FOR CERVICAL LENGTH: ICD-10-CM

## 2019-09-19 DIAGNOSIS — O09.299 HX OF PREECLAMPSIA, PRIOR PREGNANCY, CURRENTLY PREGNANT: ICD-10-CM

## 2019-09-19 DIAGNOSIS — Z81.8 FAMILY HISTORY OF AUTISM IN SIBLING: ICD-10-CM

## 2019-09-19 DIAGNOSIS — O35.2XX0 HEREDITARY DISEASE IN FAMILY POSSIBLY AFFECTING FETUS, AFFECTING MANAGEMENT OF MOTHER IN PREGNANCY, SINGLE OR UNSPECIFIED FETUS: ICD-10-CM

## 2019-09-19 DIAGNOSIS — Z3A.20 20 WEEKS GESTATION OF PREGNANCY: ICD-10-CM

## 2019-09-19 DIAGNOSIS — O99.212 OBESITY AFFECTING PREGNANCY IN SECOND TRIMESTER: ICD-10-CM

## 2019-09-19 DIAGNOSIS — O34.219 PREGNANCY WITH HISTORY OF CESAREAN SECTION, ANTEPARTUM: Primary | ICD-10-CM

## 2019-09-19 PROCEDURE — 76817 TRANSVAGINAL US OBSTETRIC: CPT | Performed by: OBSTETRICS & GYNECOLOGY

## 2019-09-19 PROCEDURE — 99213 OFFICE O/P EST LOW 20 MIN: CPT | Performed by: OBSTETRICS & GYNECOLOGY

## 2019-09-19 PROCEDURE — 76811 OB US DETAILED SNGL FETUS: CPT | Performed by: OBSTETRICS & GYNECOLOGY

## 2019-09-19 RX ORDER — LANOLIN ALCOHOL/MO/W.PET/CERES
1000 CREAM (GRAM) TOPICAL DAILY
COMMUNITY

## 2019-09-19 NOTE — Clinical Note
This patient has a prior child with autism  Recommend screening for maternal fragile X   Can you please review to see if she has insurance coverage for this screened for fragile Tenisha Dillon MD

## 2019-09-19 NOTE — PROGRESS NOTES
A transvaginal ultrasound was performed  Sonographer note on use of High Level Disinfection Process (Trophon) for transvaginal probe# 4 used, serial # [id number]    Yani Petty RDMS

## 2019-09-19 NOTE — LETTER
September 20, 2019     Lenora Vazquez MD  [address] [place] [address]    Patient: Junie Rivera   YOB: 1990   Date of Visit: 9/19/2019       Dear Dr Suzi eLe:    Thank you for referring Junie Rivera to me for evaluation  Below are my notes for this consultation  If you have questions, please do not hesitate to call me  I look forward to following your patient along with you  Sincerely,        Elisabeth Gray MD        CC: No Recipients  Elisabeth Gray MD  9/19/2019  1:00 PM  Sign at close encounter    Please refer to the [facility] ultrasound report in Ob Procedures for additional information regarding the visit to the [facility]  today    Elisabeth Gray MD

## 2019-09-19 NOTE — PROGRESS NOTES
Please refer to the [facility] ultrasound report in Ob Procedures for additional information regarding the visit to the [facility]  today    Gianluca Walker MD

## 2019-10-04 ENCOUNTER — DOCUMENTATION (OUTPATIENT)
Dept: PERINATAL CARE | Facility: CLINIC | Age: 29
End: 2019-10-04

## 2019-10-04 NOTE — PROGRESS NOTES
Morris Mckeon from [facility] # [id number] is for the fragile X screening (14167) valid from 10/2/19 - 12/3/19

## 2019-10-09 ENCOUNTER — TELEPHONE (OUTPATIENT)
Dept: PERINATAL CARE | Facility: CLINIC | Age: 29
End: 2019-10-09

## 2019-10-09 NOTE — TELEPHONE ENCOUNTER
Received routed message requesting that I contacted the patient to inform her that fragile X carrier testing was approved  Janiya Rojas # is [id number] per Timmy Burgos from [employer]  and it has to be done at [facility]   Telephone call to patient  Confirmed date of birth  Informed patient of the authorization  She is not sure if she still has the paperwork issued to her at the time of her visit with Dr Nadya Quinn her  Advised patient I would fill out a [employer] requisition form for fragile X testing  She requested to pick it up at the  of the office  Instructed her to go to any [facility] straw site for blood work and the results will take approximately 2 weeks  Patient expressed understanding is no additional questions at this time  Paperwork complete and left in envelope at  as patient requested

## 2019-10-15 ENCOUNTER — TELEPHONE (OUTPATIENT)
Dept: OBGYN CLINIC | Facility: CLINIC | Age: 29
End: 2019-10-15

## 2019-11-07 ENCOUNTER — TELEPHONE (OUTPATIENT)
Dept: NEUROLOGY | Facility: CLINIC | Age: 29
End: 2019-11-07

## 2019-11-07 NOTE — TELEPHONE ENCOUNTER
Pt called and states that Dr Chito Bhatia referred her to ophthalmology  States that Dr Sushila Almaraz never called her back to schedule an appt so she found ophthalmology that accepts her insurance  She has an appt w/ [facility] tmrw and they need her most recent office notes  She is unable to stop at the office fill out JIMMY  Called [facility], spoke w/ Maureen Govern  She will fax medical record request to [phone]  Awaiting fax           [facility]  [phone]  fax [phone]

## 2019-11-08 NOTE — TELEPHONE ENCOUNTER
Office visit dated 8/27/19 faxed to [facility] at [phone]

## 2019-11-09 LAB
FMR1 GENE CGG RPT BLD/T QL: NEGATIVE
FMR1 GENE MUT ANL BLD/T: NORMAL
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
## 2019-11-12 ENCOUNTER — OFFICE VISIT (OUTPATIENT)
Dept: NEUROLOGY | Facility: CLINIC | Age: 29
End: 2019-11-12
Payer: COMMERCIAL

## 2019-11-12 VITALS
HEART RATE: 86 BPM | SYSTOLIC BLOOD PRESSURE: 116 MMHG | BODY MASS INDEX: 34.75 KG/M2 | RESPIRATION RATE: 16 BRPM | HEIGHT: 65 IN | DIASTOLIC BLOOD PRESSURE: 68 MMHG | WEIGHT: 208.6 LBS

## 2019-11-12 DIAGNOSIS — E53.8 B12 DEFICIENCY DUE TO DIET: ICD-10-CM

## 2019-11-12 DIAGNOSIS — G43.009 MIGRAINE WITHOUT AURA AND WITHOUT STATUS MIGRAINOSUS, NOT INTRACTABLE: Primary | ICD-10-CM

## 2019-11-12 PROCEDURE — 99213 OFFICE O/P EST LOW 20 MIN: CPT | Performed by: PSYCHIATRY & NEUROLOGY

## 2019-11-12 RX ORDER — FERROUS SULFATE 325(65) MG
325 TABLET ORAL DAILY
COMMUNITY

## 2019-11-12 NOTE — PATIENT INSTRUCTIONS
You do not have idiopathic intracranial hypertension/pseudotumor cerebri    Any medication you take I would have a discussion with OB prior     Continue 1000 mcg sublingual  We can recheck B12 level     Headache/migraine treatment:   Abortive medications (for immediate treatment of a headache): It is ok to take  Acetaminophen if they help your headaches you should limit these to No more than 3 times a week to avoid medication overuse/rebound headaches       Over the counter preventive supplements for headaches/migraines   (to take every day to help prevent headaches - not to take at the time of headache):  *I recommend multivitamin at least, B12 due to vegetarian diet  [] Magnesium 400mg daily (If any diarrhea or upset stomach, decrease dose  as tolerated)  [] Riboflavin (Vitamin B2) 400mg daily (adults)   (FYI B2 may make your urine bright/neon yellow)     Prescription preventive medications for headaches/migraines   (to take every day to help prevent headaches - not to take at the time of headache):  [x] not indicated at this time      Lifestyle Recommendations:  [x] SLEEP - Maintain a regular sleep schedule: Adults need at least 7-8 hours of uninterrupted a night  Maintain good sleep hygiene:  Going to bed and waking up at consistent times, avoiding excessive daytime naps, avoiding caffeinated beverages in the evening, avoid excessive stimulation in the evening and generally using bed primarily for sleeping  One hour before bedtime would recommend turning lights down lower, decreasing your activity (may read quietly, listen to music at a low volume)  When you get into bed, should eliminate all technology (no texting, emailing, playing with your phone, iPad or tablet in bed)  [x] HYDRATION - Maintain good hydration  Drink  2L of fluid a day (4 typical small water bottles)  [x] DIET - Maintain good nutrition  In particular don't skip meals and try and eat healthy balanced meals regularly    [x] TRIGGERS - Look for other triggers and avoid them: Limit caffeine to 1-2 cups a day or less  Avoid dietary triggers that you have noticed bring on your headaches (this could include aged cheese, peanuts, MSG, aspartame and nitrates)      Education and Follow-up  [x] Please call with any questions or concerns  Of course if any new concerning symptoms go to the emergency department     [x] Follow up with Dr Matthew Cook 2/25/19

## 2019-11-14 LAB — VIT B12 SERPL-MCNC: 420 PG/ML (ref 200–1100)

## 2019-12-30 ENCOUNTER — OFFICE VISIT (OUTPATIENT)
Dept: URGENT CARE | Age: 29
End: 2019-12-30
Payer: COMMERCIAL

## 2019-12-30 VITALS
DIASTOLIC BLOOD PRESSURE: 64 MMHG | OXYGEN SATURATION: 96 % | RESPIRATION RATE: 20 BRPM | HEIGHT: 65 IN | WEIGHT: 222 LBS | TEMPERATURE: 99.9 F | BODY MASS INDEX: 36.99 KG/M2 | HEART RATE: 108 BPM | SYSTOLIC BLOOD PRESSURE: 107 MMHG

## 2019-12-30 DIAGNOSIS — R30.0 DYSURIA: ICD-10-CM

## 2019-12-30 DIAGNOSIS — J06.9 UPPER RESPIRATORY TRACT INFECTION, UNSPECIFIED TYPE: Primary | ICD-10-CM

## 2019-12-30 LAB
S PYO AG THROAT QL: NEGATIVE
SL AMB  POCT GLUCOSE, UA: NEGATIVE
SL AMB LEUKOCYTE ESTERASE,UA: NEGATIVE
SL AMB POCT BILIRUBIN,UA: NEGATIVE
SL AMB POCT BLOOD,UA: ABNORMAL
SL AMB POCT CLARITY,UA: ABNORMAL
SL AMB POCT COLOR,UA: YELLOW
SL AMB POCT KETONES,UA: NEGATIVE
SL AMB POCT NITRITE,UA: NEGATIVE
SL AMB POCT PH,UA: 6
SL AMB POCT SPECIFIC GRAVITY,UA: 1
SL AMB POCT URINE PROTEIN: NEGATIVE
SL AMB POCT UROBILINOGEN: 0.2

## 2019-12-30 PROCEDURE — 81002 URINALYSIS NONAUTO W/O SCOPE: CPT | Performed by: PHYSICIAN ASSISTANT

## 2019-12-30 PROCEDURE — 99283 EMERGENCY DEPT VISIT LOW MDM: CPT | Performed by: PHYSICIAN ASSISTANT

## 2019-12-30 PROCEDURE — 99203 OFFICE O/P NEW LOW 30 MIN: CPT | Performed by: PHYSICIAN ASSISTANT

## 2019-12-30 PROCEDURE — 87086 URINE CULTURE/COLONY COUNT: CPT | Performed by: PHYSICIAN ASSISTANT

## 2019-12-30 PROCEDURE — 87880 STREP A ASSAY W/OPTIC: CPT | Performed by: PHYSICIAN ASSISTANT

## 2019-12-30 PROCEDURE — 87070 CULTURE OTHR SPECIMN AEROBIC: CPT | Performed by: PHYSICIAN ASSISTANT

## 2019-12-30 PROCEDURE — G0382 LEV 3 HOSP TYPE B ED VISIT: HCPCS | Performed by: PHYSICIAN ASSISTANT

## 2019-12-31 LAB — BACTERIA UR CULT: NORMAL

## 2019-12-31 NOTE — PROGRESS NOTES
[facility]        NAME: Justino Fine is a 30 y o  female  : 1990    MRN: [id number]  DATE: 2019  TIME: 7:39 PM    Assessment and Plan   Upper respiratory tract infection, unspecified type [J06 9]  1  Upper respiratory tract infection, unspecified type  POCT rapid strepA    Throat culture   2  Dysuria  POCT urine dip    Urine culture     Patient appears clinically well, no acute findings  Patient currently afebrile  Patient has normal fetal movement  Is no vaginal discharge or abdominal cramping  Strep negative  Urinalysis shows no signs of infection or proteinuria  Symptoms likely represent viral upper respiratory infection  Patient adamantly denies any possibility of flu because she has not been out in public for days  Influenza is unlikely without fevers  Educated patient on temperature go to referral oral rehydration indications to follow up with the emergency medicine  Patient agrees to follow up with OBGYN this week  Patient states she understands and agrees with this plan  Patient Instructions       Continue to monitor symptoms  Drink plenty of fluids  Take over-the-counter acetaminophen or ibuprofen for fever control  Follow up with family doctor this week  Go to emergency room if new or worsening symptoms develop  Chief Complaint     Chief Complaint   Patient presents with    Sore Throat     Pt reports starting yesterday with fevers, chills, body aches and sore throat  Took Tylenol ES yesterday  No N, V or D  Pt is 36 weeks pregnant   Possible UTI     Pt reports seeing foam in her urine yesterday is concerned about proteinuria which she had two weeks ago and one month ago  History of Present Illness       Sore Throat    This is a new problem  The current episode started yesterday  The problem has been unchanged  Neither side of throat is experiencing more pain than the other   The maximum temperature recorded prior to her arrival was 100 4 - 100 9 F  The fever has been present for less than 1 day  The pain is mild  Associated symptoms include congestion and headaches  Pertinent negatives include no abdominal pain, coughing, diarrhea, drooling, ear discharge, ear pain, hoarse voice, plugged ear sensation, neck pain, shortness of breath, stridor, swollen glands, trouble swallowing or vomiting  Associated symptoms comments: Decreased appetite  Drinking normal liquids  T-max 100 8°  Patient has not taken any oral antipyretics today and patient is not febrile    She has had no exposure to strep or mono  She has tried nothing for the symptoms  The treatment provided no relief  Patient is 36 weeks pregnant  Patient has a history of protein urea with concern for preeclampsia  Patient noticed phone in her urine today which her doctor told her could be a sign of protein urea  Patient called her OBGYN who she discussed her URI symptoms as well as her urinary symptoms with  OBGYN wanted her to come to the urgent care to get tested for protein urea and strep  Patient did not get flu shot  Patient states that she does not bleed in the flu shot  Patient has no known sick contacts  Patient states that due to the holiday she has been at home for 5 days and has not gotten out  She lives with her small child who is also not sick  Review of Systems   Review of Systems   HENT: Positive for congestion and sore throat  Negative for drooling, ear discharge, ear pain, hoarse voice and trouble swallowing  Respiratory: Negative for cough, chest tightness, shortness of breath and stridor  Cardiovascular: Negative for chest pain and palpitations  Gastrointestinal: Negative for abdominal pain, diarrhea, nausea and vomiting  Genitourinary: Negative for decreased urine volume, difficulty urinating, dysuria, frequency, urgency, vaginal bleeding and vaginal discharge  Musculoskeletal: Negative for back pain and neck pain     Skin: Negative for pallor and rash  Neurological: Positive for headaches  Current Medications       Current Outpatient Medications:     aspirin 81 mg chewable tablet, Chew 81 mg daily, Disp: , Rfl:     ferrous sulfate 325 (65 Fe) mg tablet, Take 325 mg by mouth daily, Disp: , Rfl:     vitamin B-12 (VITAMIN B-12) 1,000 mcg tablet, Take by mouth daily, Disp: , Rfl:     Current Allergies     Allergies as of 2019    (No Known Allergies)            The following portions of the patient's history were reviewed and updated as appropriate: allergies, current medications, past family history, past medical history, past social history, past surgical history and problem list      Past Medical History:   Diagnosis Date    Anxiety     Obesity affecting pregnancy in second trimester 2019    Preeclampsia     Pseudotumor cerebri syndrome        Past Surgical History:   Procedure Laterality Date     SECTION      WISDOM TOOTH EXTRACTION         Family History   Problem Relation Age of Onset    No Known Problems Mother     Hypertension Father     PKU Brother         Carrier    Autism Son          Medications have been verified  Objective   /64   Pulse (!) 108 Comment: Manual  Temp 99 9 °F (37 7 °C)   Resp 20   Ht 5' 5" (1 651 m)   Wt 101 kg (222 lb)   LMP 2019   SpO2 96%   BMI 36 94 kg/m²        Physical Exam     Physical Exam   Constitutional: She appears well-developed and well-nourished  No distress  HENT:   Head: Normocephalic and atraumatic  Right Ear: Hearing, tympanic membrane, external ear and ear canal normal    Left Ear: Hearing, tympanic membrane, external ear and ear canal normal    Nose: Mucosal edema present  Right sinus exhibits no maxillary sinus tenderness and no frontal sinus tenderness  Left sinus exhibits no maxillary sinus tenderness and no frontal sinus tenderness     Mouth/Throat: No oropharyngeal exudate, posterior oropharyngeal edema or posterior oropharyngeal erythema  Eyes: Conjunctivae are normal  Right eye exhibits no discharge  Left eye exhibits no discharge  Neck: Normal range of motion  Neck supple  Cardiovascular: Normal rate, regular rhythm, normal heart sounds and intact distal pulses  Pulmonary/Chest: Effort normal and breath sounds normal  No respiratory distress  She has no wheezes  She has no rales  Lymphadenopathy:     She has no cervical adenopathy  Skin: Skin is warm  Capillary refill takes less than 2 seconds  No rash noted  She is not diaphoretic  No pallor  Nursing note and vitals reviewed

## 2019-12-31 NOTE — PATIENT INSTRUCTIONS
Continue to monitor symptoms  Drink plenty of fluids  Take over-the-counter acetaminophen or ibuprofen for fever control  Follow up with family doctor this week  Go to emergency room if new or worsening symptoms develop  Cold Symptoms   WHAT YOU NEED TO KNOW:   A cold is an infection caused by a virus  The infection causes your upper respiratory system to become inflamed  Common symptoms of a cold include sneezing, dry throat, a stuffy nose, headache, watery eyes, and a cough  Your cough may be dry, or you may cough up mucus  You may also have muscle aches, joint pain, and tiredness  Rarely, you may have a fever  Most colds go away without treatment  DISCHARGE INSTRUCTIONS:   Return to the emergency department if:   · You have increased tiredness and weakness  · You are unable to eat  · Your heart is beating much faster than usual for you  · You see white spots in the back of your throat and your neck is swollen and sore to the touch  · You see pinpoint or larger reddish-purple dots on your skin  Contact your healthcare provider if:   · You have a fever higher than 102°F (38 9°C)  · You have new or worsening shortness of breath  · You have thick nasal drainage for more than 2 days  · Your symptoms do not improve or get worse within 5 days  · You have questions or concerns about your condition or care  Medicines: The following medicines may be suggested by your healthcare provider to decrease your cold symptoms  These medicines are available without a doctor's order  Ask which medicines to take and when to take them  Follow directions  · NSAIDs or acetaminophen  help to bring down a fever or decrease pain  · Decongestants  help decrease nasal stuffiness  · Antihistamines  help decrease sneezing and a runny nose  · Cough suppressants  help decrease how much you cough  · Expectorants  help loosen mucus so you can cough it up  · Take your medicine as directed  Contact your healthcare provider if you think your medicine is not helping or if you have side effects  Tell him of her if you are allergic to any medicine  Keep a list of the medicines, vitamins, and herbs you take  Include the amounts, and when and why you take them  Bring the list or the pill bottles to follow-up visits  Carry your medicine list with you in case of an emergency  Symptom relief: The following may help relieve cold symptoms, such as a dry throat and congestion:  · Gargle with mouthwash or warm salt water as directed  · Suck on throat lozenges or hard candy  · Use a cold or warm vaporizer or humidifier to ease your breathing  · Rest for at least 2 days and then as needed to decrease tiredness and weakness  · Use petroleum based jelly around your nostrils to decrease irritation from blowing your nose  Drink liquids:  Liquids will help thin and loosen thick mucus so you can cough it up  Liquids will also keep you hydrated  Ask your healthcare provider which liquids are best for you and how much to drink each day  Prevent the spread of germs: You can spread your cold germs to others for at least 3 days after your symptoms start  Wash your hands often  Do not share items, such as eating utensils  Cover your nose and mouth when you cough or sneeze using the crook of your elbow instead of your hands  Throw used tissues in the garbage  Do not smoke:  Smoking may worsen your symptoms and increase the length of time you feel sick  Talk with your healthcare provider if you need help to stop smoking  Follow up with your healthcare provider as directed:  Write down your questions so you remember to ask them during your visits  © 2017 [employer]  Information is for End User's use only and may not be sold, redistributed or otherwise used for commercial purposes   All illustrations and images included in CareNotes® are the copyrighted property of [employer]  or [facility] [employer]  The above information is an  only  It is not intended as medical advice for individual conditions or treatments  Talk to your doctor, nurse or pharmacist before following any medical regimen to see if it is safe and effective for you

## 2020-01-01 LAB — BACTERIA THROAT CULT: NORMAL

## 2020-02-11 ENCOUNTER — TELEPHONE (OUTPATIENT)
Dept: INTERNAL MEDICINE CLINIC | Facility: CLINIC | Age: 30
End: 2020-02-11

## 2020-02-11 NOTE — TELEPHONE ENCOUNTER
[facility] calling because they need a referral placed in her chart    Patient has an appointment on 2/18/20 and the diagnosis codes G43 [id number], E53 [id number]

## 2020-02-12 DIAGNOSIS — R42 DIZZINESS: ICD-10-CM

## 2020-02-12 DIAGNOSIS — Z87.828 HISTORY OF TRAUMATIC HEAD INJURY: ICD-10-CM

## 2020-02-12 DIAGNOSIS — H53.8 BLURRED VISION, BILATERAL: ICD-10-CM

## 2020-02-12 DIAGNOSIS — G43.009 MIGRAINE WITHOUT AURA AND WITHOUT STATUS MIGRAINOSUS, NOT INTRACTABLE: Primary | ICD-10-CM

## 2020-02-12 DIAGNOSIS — R42 VERTIGO: ICD-10-CM

## 2020-02-18 ENCOUNTER — OFFICE VISIT (OUTPATIENT)
Dept: NEUROLOGY | Facility: CLINIC | Age: 30
End: 2020-02-18
Payer: COMMERCIAL

## 2020-02-18 VITALS
SYSTOLIC BLOOD PRESSURE: 132 MMHG | HEIGHT: 65 IN | DIASTOLIC BLOOD PRESSURE: 74 MMHG | BODY MASS INDEX: 34.12 KG/M2 | HEART RATE: 81 BPM | WEIGHT: 204.8 LBS

## 2020-02-18 DIAGNOSIS — G43.009 MIGRAINE WITHOUT AURA AND WITHOUT STATUS MIGRAINOSUS, NOT INTRACTABLE: ICD-10-CM

## 2020-02-18 DIAGNOSIS — E55.9 VITAMIN D DEFICIENCY: ICD-10-CM

## 2020-02-18 DIAGNOSIS — R42 DIZZINESS: ICD-10-CM

## 2020-02-18 DIAGNOSIS — E53.8 B12 DEFICIENCY DUE TO DIET: Primary | ICD-10-CM

## 2020-02-18 DIAGNOSIS — R42 VERTIGO: ICD-10-CM

## 2020-02-18 DIAGNOSIS — H53.8 BLURRED VISION, BILATERAL: ICD-10-CM

## 2020-02-18 DIAGNOSIS — Z87.828 HISTORY OF TRAUMATIC HEAD INJURY: ICD-10-CM

## 2020-02-18 PROCEDURE — 99354 PR PROLONGED SVC OUTPATIENT SETTING 1ST HOUR: CPT | Performed by: PSYCHIATRY & NEUROLOGY

## 2020-02-18 PROCEDURE — 1036F TOBACCO NON-USER: CPT | Performed by: PSYCHIATRY & NEUROLOGY

## 2020-02-18 PROCEDURE — 3008F BODY MASS INDEX DOCD: CPT | Performed by: PSYCHIATRY & NEUROLOGY

## 2020-02-18 PROCEDURE — 99215 OFFICE O/P EST HI 40 MIN: CPT | Performed by: PSYCHIATRY & NEUROLOGY

## 2020-02-18 RX ORDER — VERAPAMIL HYDROCHLORIDE 40 MG/1
TABLET ORAL
Qty: 60 TABLET | Refills: 3 | Status: SHIPPED | OUTPATIENT
Start: 2020-02-18

## 2020-02-18 RX ORDER — IBUPROFEN 200 MG
400 TABLET ORAL AS NEEDED
COMMUNITY
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
## 2020-02-18 NOTE — PROGRESS NOTES
Review of Systems   Constitutional: Positive for fatigue  HENT: Positive for tinnitus  Eyes: Positive for photophobia and pain  Blurred Vision    Respiratory: Negative  Cardiovascular: Negative  Gastrointestinal: Negative  Endocrine: Negative  Genitourinary: Negative  Musculoskeletal: Negative  Skin: Negative  Allergic/Immunologic: Negative  Neurological: Positive for numbness and headaches  Hematological: Negative  Psychiatric/Behavioral: Negative

## 2020-02-18 NOTE — PATIENT INSTRUCTIONS
Chronic migraine headache without aura  Primary stabbing headaches  Preventive therapy for headaches:   -Over-the-counter supplements: to decrease intensity and frequency of migraines  - Magnesium Oxide 400mg a day  If any diarrhea or upset stomach, decrease dose  as tolerated  (oral magnesium oxide may be an effective preventive strategy for people with migraine  Some theories about how it works include the idea that magnesium can help to prevent waves of cortical spreading depression and aura  Magnesium, in theory, also reduces the release of inflammatory or activating chemicals that can cause migraine)  - Vitamin B2 200 mg twice a day  May cause the urine to turn yellow which is normal for B 2 to do and is not a sign that you are dehydrated  (may be an effective preventive medication in some people with migraine)  - Vitamin E which may help with menstrual migraine  There is limited data on this, but it may reduce nausea, photophobia and phonophobia during menstruation  -B12 1000 micro g per day    For   Her primary stabbing headache will start patient on verapamil 40 mg half a tab twice a day increase up to 40 mg twice a day after 7 days  Abortive therapy for headaches:   -  At the onset of her mild-to-moderate headache patient may take Motrin but less than 3 times a week  Work up:   - lab:  B12, vitamin-D,  CBC    Headache management instructions  - When patient has a moderate to severe headache, they should seek rest, initiate relaxation and apply cold compresses to the head  - Maintain regular sleep schedule  Adults need at least 7-8 hours of uninterrupted a night  - Limit over the counter medications such as Tylenol, Ibuprofen, Aleve, Excedrin  (No more than 2- 3 times a week or max 10 a month)  - Maintain headache diary  Free SANDEE for a smart phone, which can be used is "Migraine edil"  - Limit caffeine to 1-2 cups 8 to 16 oz a day or less    - Avoid dietary trigger  (aged cheese, peanuts, MSG, aspartame and nitrates)  - Patient is to have regular frequent meals to prevent headache onset  - Please drink at least 64 ounces of water a day to help remain hydrated  Importance of Healthy Sleep:  Behavioral sleep changes can promote restful, regular sleep and reduce headache  Simple changes like establishing consistent sleep and wake-up times, as well as getting between 7 and 8 hours of sleep a day, can make a world of difference  Experts also recommend avoiding substances that impair sleep, like caffeine, nicotine and alcohol, and also suggest winding down before bed to prevent sleep problems  To read more go to https://[employer]  org/resource-library/sleep/    Exercising for migraineurs:  Regular exercise can reduce the frequency and intensity of headaches and migraines  When one exercises, the body releases endorphins, which are the bodys natural painkillers  Exercise reduces stress and helps individuals to sleep at night  Exercising at least 30 to 40 minutes 3 times a week is sufficient for most patients  When exercising, follow this plan to prevent headaches:  - First, stay hydrated before, during, and after exercise  - Second part of the exercise plan is to eat sufficient food about an hour and a half before you exercise  Exercise causes ones blood sugar level to decrease, and it is important to have a source of energy    - Final part of the exercise plan is to warm-up  Do not jump into sudden, vigorous exercise if that triggers a headache or migraine  To read more go to [email or web address]  org/resource-library/effects-of-exercise-on-headaches-and-migraines/     Please call with any questions or concerns   Office number is [phone]

## 2020-06-20 ENCOUNTER — OFFICE VISIT (OUTPATIENT)
Dept: URGENT CARE | Age: 30
End: 2020-06-20
Payer: COMMERCIAL

## 2020-06-20 VITALS
HEART RATE: 80 BPM | HEIGHT: 65 IN | TEMPERATURE: 97.3 F | RESPIRATION RATE: 18 BRPM | WEIGHT: 204 LBS | OXYGEN SATURATION: 98 % | BODY MASS INDEX: 33.99 KG/M2 | DIASTOLIC BLOOD PRESSURE: 80 MMHG | SYSTOLIC BLOOD PRESSURE: 133 MMHG

## 2020-06-20 DIAGNOSIS — H01.001 BLEPHARITIS OF RIGHT UPPER EYELID, UNSPECIFIED TYPE: Primary | ICD-10-CM

## 2020-06-20 PROCEDURE — 99283 EMERGENCY DEPT VISIT LOW MDM: CPT | Performed by: PHYSICIAN ASSISTANT

## 2020-06-20 PROCEDURE — 99213 OFFICE O/P EST LOW 20 MIN: CPT | Performed by: PHYSICIAN ASSISTANT

## 2020-06-20 PROCEDURE — G0382 LEV 3 HOSP TYPE B ED VISIT: HCPCS | Performed by: PHYSICIAN ASSISTANT

## 2020-06-20 RX ORDER — OFLOXACIN 3 MG/ML
1 SOLUTION/ DROPS OPHTHALMIC 4 TIMES DAILY
Qty: 5 ML | Refills: 0 | Status: SHIPPED | OUTPATIENT
Start: 2020-06-20

## 2020-06-20 RX ORDER — AMOXICILLIN 500 MG/1
500 CAPSULE ORAL EVERY 8 HOURS SCHEDULED
Qty: 21 CAPSULE | Refills: 0 | Status: SHIPPED | OUTPATIENT
Start: 2020-06-20 | End: 2020-06-27